# Patient Record
Sex: MALE | Race: WHITE | NOT HISPANIC OR LATINO | URBAN - METROPOLITAN AREA
[De-identification: names, ages, dates, MRNs, and addresses within clinical notes are randomized per-mention and may not be internally consistent; named-entity substitution may affect disease eponyms.]

---

## 2025-04-08 ENCOUNTER — HOSPITAL ENCOUNTER (INPATIENT)
Facility: HOSPITAL | Age: 45
LOS: 2 days | Discharge: HOME | End: 2025-04-10
Attending: STUDENT IN AN ORGANIZED HEALTH CARE EDUCATION/TRAINING PROGRAM | Admitting: INTERNAL MEDICINE
Payer: COMMERCIAL

## 2025-04-08 ENCOUNTER — APPOINTMENT (OUTPATIENT)
Dept: CARDIOLOGY | Facility: HOSPITAL | Age: 45
End: 2025-04-08
Payer: COMMERCIAL

## 2025-04-08 ENCOUNTER — APPOINTMENT (OUTPATIENT)
Dept: RADIOLOGY | Facility: HOSPITAL | Age: 45
End: 2025-04-08
Payer: COMMERCIAL

## 2025-04-08 DIAGNOSIS — R93.89 ABNORMAL CT SCAN: ICD-10-CM

## 2025-04-08 DIAGNOSIS — I48.91 ATRIAL FIBRILLATION WITH RVR (MULTI): Primary | ICD-10-CM

## 2025-04-08 DIAGNOSIS — R91.8 PULMONARY NODULES: ICD-10-CM

## 2025-04-08 DIAGNOSIS — I48.91 ATRIAL FIBRILLATION WITH RAPID VENTRICULAR RESPONSE (MULTI): ICD-10-CM

## 2025-04-08 PROBLEM — B83.9 WORMS IN STOOL: Status: ACTIVE | Noted: 2025-04-08

## 2025-04-08 PROBLEM — R59.0 MEDIASTINAL ADENOPATHY: Status: ACTIVE | Noted: 2025-04-08

## 2025-04-08 LAB
ANION GAP SERPL CALC-SCNC: 11 MMOL/L (ref 10–20)
ANION GAP SERPL CALC-SCNC: 14 MMOL/L (ref 10–20)
APTT PPP: 29 SECONDS (ref 26–36)
BASOPHILS # BLD AUTO: 0.04 X10*3/UL (ref 0–0.1)
BASOPHILS NFR BLD AUTO: 0.7 %
BNP SERPL-MCNC: 346 PG/ML (ref 0–99)
BUN SERPL-MCNC: 13 MG/DL (ref 6–23)
BUN SERPL-MCNC: 13 MG/DL (ref 6–23)
CALCIUM SERPL-MCNC: 8.7 MG/DL (ref 8.6–10.3)
CALCIUM SERPL-MCNC: 8.7 MG/DL (ref 8.6–10.3)
CARDIAC TROPONIN I PNL SERPL HS: 4 NG/L (ref 0–20)
CARDIAC TROPONIN I PNL SERPL HS: 5 NG/L (ref 0–20)
CARDIAC TROPONIN I PNL SERPL HS: 5 NG/L (ref 0–20)
CHLORIDE SERPL-SCNC: 106 MMOL/L (ref 98–107)
CHLORIDE SERPL-SCNC: 107 MMOL/L (ref 98–107)
CO2 SERPL-SCNC: 23 MMOL/L (ref 21–32)
CO2 SERPL-SCNC: 24 MMOL/L (ref 21–32)
CREAT SERPL-MCNC: 0.74 MG/DL (ref 0.5–1.3)
CREAT SERPL-MCNC: 0.77 MG/DL (ref 0.5–1.3)
D DIMER PPP FEU-MCNC: <215 NG/ML FEU
EGFRCR SERPLBLD CKD-EPI 2021: >90 ML/MIN/1.73M*2
EGFRCR SERPLBLD CKD-EPI 2021: >90 ML/MIN/1.73M*2
EOSINOPHIL # BLD AUTO: 0.16 X10*3/UL (ref 0–0.7)
EOSINOPHIL NFR BLD AUTO: 2.6 %
ERYTHROCYTE [DISTWIDTH] IN BLOOD BY AUTOMATED COUNT: 12.5 % (ref 11.5–14.5)
ERYTHROCYTE [DISTWIDTH] IN BLOOD BY AUTOMATED COUNT: 12.5 % (ref 11.5–14.5)
FLUAV RNA RESP QL NAA+PROBE: NOT DETECTED
FLUBV RNA RESP QL NAA+PROBE: NOT DETECTED
GLUCOSE SERPL-MCNC: 121 MG/DL (ref 74–99)
GLUCOSE SERPL-MCNC: 94 MG/DL (ref 74–99)
HCT VFR BLD AUTO: 46.8 % (ref 41–52)
HCT VFR BLD AUTO: 46.9 % (ref 41–52)
HGB BLD-MCNC: 16.1 G/DL (ref 13.5–17.5)
HGB BLD-MCNC: 16.1 G/DL (ref 13.5–17.5)
IMM GRANULOCYTES # BLD AUTO: 0.01 X10*3/UL (ref 0–0.7)
IMM GRANULOCYTES NFR BLD AUTO: 0.2 % (ref 0–0.9)
LYMPHOCYTES # BLD AUTO: 2.02 X10*3/UL (ref 1.2–4.8)
LYMPHOCYTES NFR BLD AUTO: 33.3 %
MAGNESIUM SERPL-MCNC: 1.96 MG/DL (ref 1.6–2.4)
MAGNESIUM SERPL-MCNC: 2.06 MG/DL (ref 1.6–2.4)
MCH RBC QN AUTO: 28.8 PG (ref 26–34)
MCH RBC QN AUTO: 29.3 PG (ref 26–34)
MCHC RBC AUTO-ENTMCNC: 34.3 G/DL (ref 32–36)
MCHC RBC AUTO-ENTMCNC: 34.4 G/DL (ref 32–36)
MCV RBC AUTO: 84 FL (ref 80–100)
MCV RBC AUTO: 85 FL (ref 80–100)
MONOCYTES # BLD AUTO: 0.65 X10*3/UL (ref 0.1–1)
MONOCYTES NFR BLD AUTO: 10.7 %
NEUTROPHILS # BLD AUTO: 3.18 X10*3/UL (ref 1.2–7.7)
NEUTROPHILS NFR BLD AUTO: 52.5 %
NRBC BLD-RTO: 0 /100 WBCS (ref 0–0)
NRBC BLD-RTO: 0 /100 WBCS (ref 0–0)
PLATELET # BLD AUTO: 198 X10*3/UL (ref 150–450)
PLATELET # BLD AUTO: 203 X10*3/UL (ref 150–450)
POTASSIUM SERPL-SCNC: 3.7 MMOL/L (ref 3.5–5.3)
POTASSIUM SERPL-SCNC: 4 MMOL/L (ref 3.5–5.3)
PSA SERPL-MCNC: 2.47 NG/ML
Q ONSET: 220 MS
Q ONSET: 221 MS
QRS COUNT: 20 BEATS
QRS COUNT: 21 BEATS
QRS DURATION: 82 MS
QRS DURATION: 84 MS
QT INTERVAL: 304 MS
QT INTERVAL: 314 MS
QTC CALCULATION(BAZETT): 443 MS
QTC CALCULATION(BAZETT): 447 MS
QTC FREDERICIA: 391 MS
QTC FREDERICIA: 397 MS
R AXIS: 44 DEGREES
R AXIS: 45 DEGREES
RBC # BLD AUTO: 5.49 X10*6/UL (ref 4.5–5.9)
RBC # BLD AUTO: 5.59 X10*6/UL (ref 4.5–5.9)
SARS-COV-2 RNA RESP QL NAA+PROBE: NOT DETECTED
SODIUM SERPL-SCNC: 138 MMOL/L (ref 136–145)
SODIUM SERPL-SCNC: 139 MMOL/L (ref 136–145)
T AXIS: 21 DEGREES
T AXIS: 42 DEGREES
T OFFSET: 373 MS
T OFFSET: 377 MS
TSH SERPL-ACNC: 2.57 MIU/L (ref 0.44–3.98)
UFH PPP CHRO-ACNC: 0.4 IU/ML
UFH PPP CHRO-ACNC: 0.8 IU/ML
VENTRICULAR RATE: 122 BPM
VENTRICULAR RATE: 128 BPM
WBC # BLD AUTO: 6.1 X10*3/UL (ref 4.4–11.3)
WBC # BLD AUTO: 6.1 X10*3/UL (ref 4.4–11.3)

## 2025-04-08 PROCEDURE — 84484 ASSAY OF TROPONIN QUANT: CPT | Performed by: STUDENT IN AN ORGANIZED HEALTH CARE EDUCATION/TRAINING PROGRAM

## 2025-04-08 PROCEDURE — 83880 ASSAY OF NATRIURETIC PEPTIDE: CPT | Performed by: STUDENT IN AN ORGANIZED HEALTH CARE EDUCATION/TRAINING PROGRAM

## 2025-04-08 PROCEDURE — 36415 COLL VENOUS BLD VENIPUNCTURE: CPT | Performed by: INTERNAL MEDICINE

## 2025-04-08 PROCEDURE — 2500000005 HC RX 250 GENERAL PHARMACY W/O HCPCS: Performed by: STUDENT IN AN ORGANIZED HEALTH CARE EDUCATION/TRAINING PROGRAM

## 2025-04-08 PROCEDURE — 99222 1ST HOSP IP/OBS MODERATE 55: CPT | Performed by: STUDENT IN AN ORGANIZED HEALTH CARE EDUCATION/TRAINING PROGRAM

## 2025-04-08 PROCEDURE — 93005 ELECTROCARDIOGRAM TRACING: CPT

## 2025-04-08 PROCEDURE — 71045 X-RAY EXAM CHEST 1 VIEW: CPT

## 2025-04-08 PROCEDURE — 85520 HEPARIN ASSAY: CPT | Performed by: NURSE PRACTITIONER

## 2025-04-08 PROCEDURE — 71275 CT ANGIOGRAPHY CHEST: CPT | Performed by: RADIOLOGY

## 2025-04-08 PROCEDURE — 99223 1ST HOSP IP/OBS HIGH 75: CPT | Performed by: INTERNAL MEDICINE

## 2025-04-08 PROCEDURE — 84484 ASSAY OF TROPONIN QUANT: CPT | Performed by: INTERNAL MEDICINE

## 2025-04-08 PROCEDURE — 80048 BASIC METABOLIC PNL TOTAL CA: CPT | Performed by: STUDENT IN AN ORGANIZED HEALTH CARE EDUCATION/TRAINING PROGRAM

## 2025-04-08 PROCEDURE — 93306 TTE W/DOPPLER COMPLETE: CPT

## 2025-04-08 PROCEDURE — 2500000001 HC RX 250 WO HCPCS SELF ADMINISTERED DRUGS (ALT 637 FOR MEDICARE OP): Performed by: INTERNAL MEDICINE

## 2025-04-08 PROCEDURE — 84153 ASSAY OF PSA TOTAL: CPT | Mod: GEALAB | Performed by: INTERNAL MEDICINE

## 2025-04-08 PROCEDURE — 2500000005 HC RX 250 GENERAL PHARMACY W/O HCPCS: Performed by: INTERNAL MEDICINE

## 2025-04-08 PROCEDURE — 74174 CTA ABD&PLVS W/CONTRAST: CPT | Performed by: RADIOLOGY

## 2025-04-08 PROCEDURE — 83735 ASSAY OF MAGNESIUM: CPT | Performed by: STUDENT IN AN ORGANIZED HEALTH CARE EDUCATION/TRAINING PROGRAM

## 2025-04-08 PROCEDURE — 99221 1ST HOSP IP/OBS SF/LOW 40: CPT

## 2025-04-08 PROCEDURE — 2500000004 HC RX 250 GENERAL PHARMACY W/ HCPCS (ALT 636 FOR OP/ED): Performed by: STUDENT IN AN ORGANIZED HEALTH CARE EDUCATION/TRAINING PROGRAM

## 2025-04-08 PROCEDURE — 96374 THER/PROPH/DIAG INJ IV PUSH: CPT

## 2025-04-08 PROCEDURE — 96375 TX/PRO/DX INJ NEW DRUG ADDON: CPT

## 2025-04-08 PROCEDURE — 96376 TX/PRO/DX INJ SAME DRUG ADON: CPT

## 2025-04-08 PROCEDURE — 93306 TTE W/DOPPLER COMPLETE: CPT | Performed by: STUDENT IN AN ORGANIZED HEALTH CARE EDUCATION/TRAINING PROGRAM

## 2025-04-08 PROCEDURE — 2500000004 HC RX 250 GENERAL PHARMACY W/ HCPCS (ALT 636 FOR OP/ED): Performed by: NURSE PRACTITIONER

## 2025-04-08 PROCEDURE — 71275 CT ANGIOGRAPHY CHEST: CPT

## 2025-04-08 PROCEDURE — 99291 CRITICAL CARE FIRST HOUR: CPT | Performed by: STUDENT IN AN ORGANIZED HEALTH CARE EDUCATION/TRAINING PROGRAM

## 2025-04-08 PROCEDURE — 85520 HEPARIN ASSAY: CPT | Performed by: FAMILY MEDICINE

## 2025-04-08 PROCEDURE — 2060000001 HC INTERMEDIATE ICU ROOM DAILY

## 2025-04-08 PROCEDURE — 80048 BASIC METABOLIC PNL TOTAL CA: CPT | Performed by: INTERNAL MEDICINE

## 2025-04-08 PROCEDURE — 71045 X-RAY EXAM CHEST 1 VIEW: CPT | Performed by: RADIOLOGY

## 2025-04-08 PROCEDURE — 83735 ASSAY OF MAGNESIUM: CPT | Performed by: INTERNAL MEDICINE

## 2025-04-08 PROCEDURE — 36415 COLL VENOUS BLD VENIPUNCTURE: CPT | Performed by: STUDENT IN AN ORGANIZED HEALTH CARE EDUCATION/TRAINING PROGRAM

## 2025-04-08 PROCEDURE — 2500000004 HC RX 250 GENERAL PHARMACY W/ HCPCS (ALT 636 FOR OP/ED): Performed by: INTERNAL MEDICINE

## 2025-04-08 PROCEDURE — 85730 THROMBOPLASTIN TIME PARTIAL: CPT | Performed by: NURSE PRACTITIONER

## 2025-04-08 PROCEDURE — 93010 ELECTROCARDIOGRAM REPORT: CPT | Performed by: STUDENT IN AN ORGANIZED HEALTH CARE EDUCATION/TRAINING PROGRAM

## 2025-04-08 PROCEDURE — 85027 COMPLETE CBC AUTOMATED: CPT | Performed by: INTERNAL MEDICINE

## 2025-04-08 PROCEDURE — 84443 ASSAY THYROID STIM HORMONE: CPT | Performed by: INTERNAL MEDICINE

## 2025-04-08 PROCEDURE — 85379 FIBRIN DEGRADATION QUANT: CPT | Performed by: STUDENT IN AN ORGANIZED HEALTH CARE EDUCATION/TRAINING PROGRAM

## 2025-04-08 PROCEDURE — 2500000001 HC RX 250 WO HCPCS SELF ADMINISTERED DRUGS (ALT 637 FOR MEDICARE OP): Performed by: NURSE PRACTITIONER

## 2025-04-08 PROCEDURE — 2550000001 HC RX 255 CONTRASTS: Performed by: STUDENT IN AN ORGANIZED HEALTH CARE EDUCATION/TRAINING PROGRAM

## 2025-04-08 PROCEDURE — 87636 SARSCOV2 & INF A&B AMP PRB: CPT | Performed by: STUDENT IN AN ORGANIZED HEALTH CARE EDUCATION/TRAINING PROGRAM

## 2025-04-08 PROCEDURE — 85025 COMPLETE CBC W/AUTO DIFF WBC: CPT | Performed by: STUDENT IN AN ORGANIZED HEALTH CARE EDUCATION/TRAINING PROGRAM

## 2025-04-08 PROCEDURE — 94760 N-INVAS EAR/PLS OXIMETRY 1: CPT

## 2025-04-08 RX ORDER — METOPROLOL TARTRATE 1 MG/ML
5 INJECTION, SOLUTION INTRAVENOUS EVERY 6 HOURS PRN
Status: DISCONTINUED | OUTPATIENT
Start: 2025-04-08 | End: 2025-04-10 | Stop reason: HOSPADM

## 2025-04-08 RX ORDER — ASPIRIN 81 MG/1
81 TABLET ORAL DAILY
Status: DISCONTINUED | OUTPATIENT
Start: 2025-04-08 | End: 2025-04-10

## 2025-04-08 RX ORDER — METOPROLOL TARTRATE 25 MG/1
25 TABLET, FILM COATED ORAL 4 TIMES DAILY
Status: DISCONTINUED | OUTPATIENT
Start: 2025-04-08 | End: 2025-04-10

## 2025-04-08 RX ORDER — DILTIAZEM HCL/D5W 125 MG/125
5-15 PLASTIC BAG, INJECTION (ML) INTRAVENOUS CONTINUOUS
Status: DISCONTINUED | OUTPATIENT
Start: 2025-04-08 | End: 2025-04-08

## 2025-04-08 RX ORDER — DILTIAZEM HYDROCHLORIDE 5 MG/ML
10 INJECTION INTRAVENOUS ONCE
Status: COMPLETED | OUTPATIENT
Start: 2025-04-08 | End: 2025-04-08

## 2025-04-08 RX ORDER — DIGOXIN 0.25 MG/ML
500 INJECTION INTRAMUSCULAR; INTRAVENOUS ONCE
Status: COMPLETED | OUTPATIENT
Start: 2025-04-08 | End: 2025-04-08

## 2025-04-08 RX ORDER — METOPROLOL TARTRATE 1 MG/ML
5 INJECTION, SOLUTION INTRAVENOUS ONCE
Status: COMPLETED | OUTPATIENT
Start: 2025-04-08 | End: 2025-04-08

## 2025-04-08 RX ORDER — DIGOXIN 0.25 MG/ML
250 INJECTION INTRAMUSCULAR; INTRAVENOUS ONCE
Status: DISCONTINUED | OUTPATIENT
Start: 2025-04-08 | End: 2025-04-08

## 2025-04-08 RX ORDER — DIGOXIN 0.25 MG/ML
125 INJECTION INTRAMUSCULAR; INTRAVENOUS DAILY
Status: DISCONTINUED | OUTPATIENT
Start: 2025-04-08 | End: 2025-04-08

## 2025-04-08 RX ORDER — DIPHENHYDRAMINE HYDROCHLORIDE 50 MG/ML
50 INJECTION, SOLUTION INTRAMUSCULAR; INTRAVENOUS ONCE
Status: COMPLETED | OUTPATIENT
Start: 2025-04-08 | End: 2025-04-08

## 2025-04-08 RX ORDER — HEPARIN SODIUM 10000 [USP'U]/100ML
0-4500 INJECTION, SOLUTION INTRAVENOUS CONTINUOUS
Status: DISPENSED | OUTPATIENT
Start: 2025-04-08 | End: 2025-04-09

## 2025-04-08 RX ADMIN — METOPROLOL TARTRATE 5 MG: 5 INJECTION INTRAVENOUS at 14:15

## 2025-04-08 RX ADMIN — DIGOXIN 125 MCG: 0.25 INJECTION INTRAMUSCULAR; INTRAVENOUS at 08:54

## 2025-04-08 RX ADMIN — ASPIRIN 81 MG: 81 TABLET, COATED ORAL at 08:56

## 2025-04-08 RX ADMIN — HEPARIN SODIUM 1500 UNITS/HR: 10000 INJECTION, SOLUTION INTRAVENOUS at 23:08

## 2025-04-08 RX ADMIN — METOPROLOL TARTRATE 25 MG: 25 TABLET, FILM COATED ORAL at 17:02

## 2025-04-08 RX ADMIN — HEPARIN SODIUM 1700 UNITS/HR: 10000 INJECTION, SOLUTION INTRAVENOUS at 13:07

## 2025-04-08 RX ADMIN — Medication 5 MG/HR: at 03:01

## 2025-04-08 RX ADMIN — DILTIAZEM HYDROCHLORIDE 10 MG: 5 INJECTION INTRAVENOUS at 03:01

## 2025-04-08 RX ADMIN — IOHEXOL 90 ML: 350 INJECTION, SOLUTION INTRAVENOUS at 01:38

## 2025-04-08 RX ADMIN — Medication 21 PERCENT: at 09:01

## 2025-04-08 RX ADMIN — METOPROLOL TARTRATE 5 MG: 5 INJECTION INTRAVENOUS at 01:24

## 2025-04-08 RX ADMIN — METOPROLOL TARTRATE 5 MG: 5 INJECTION INTRAVENOUS at 03:25

## 2025-04-08 RX ADMIN — DIPHENHYDRAMINE HYDROCHLORIDE 50 MG: 50 INJECTION, SOLUTION INTRAMUSCULAR; INTRAVENOUS at 03:25

## 2025-04-08 RX ADMIN — METOPROLOL TARTRATE 25 MG: 25 TABLET, FILM COATED ORAL at 13:07

## 2025-04-08 RX ADMIN — METHYLPREDNISOLONE SODIUM SUCCINATE 125 MG: 125 INJECTION, POWDER, FOR SOLUTION INTRAMUSCULAR; INTRAVENOUS at 03:25

## 2025-04-08 RX ADMIN — DIGOXIN 500 MCG: 0.25 INJECTION INTRAMUSCULAR; INTRAVENOUS at 06:27

## 2025-04-08 SDOH — SOCIAL STABILITY: SOCIAL INSECURITY: ARE THERE ANY APPARENT SIGNS OF INJURIES/BEHAVIORS THAT COULD BE RELATED TO ABUSE/NEGLECT?: NO

## 2025-04-08 SDOH — SOCIAL STABILITY: SOCIAL INSECURITY
WITHIN THE LAST YEAR, HAVE YOU BEEN RAPED OR FORCED TO HAVE ANY KIND OF SEXUAL ACTIVITY BY YOUR PARTNER OR EX-PARTNER?: NO

## 2025-04-08 SDOH — SOCIAL STABILITY: SOCIAL INSECURITY: WITHIN THE LAST YEAR, HAVE YOU BEEN AFRAID OF YOUR PARTNER OR EX-PARTNER?: NO

## 2025-04-08 SDOH — ECONOMIC STABILITY: TRANSPORTATION INSECURITY: IN THE PAST 12 MONTHS, HAS LACK OF TRANSPORTATION KEPT YOU FROM MEDICAL APPOINTMENTS OR FROM GETTING MEDICATIONS?: NO

## 2025-04-08 SDOH — ECONOMIC STABILITY: HOUSING INSECURITY: AT ANY TIME IN THE PAST 12 MONTHS, WERE YOU HOMELESS OR LIVING IN A SHELTER (INCLUDING NOW)?: NO

## 2025-04-08 SDOH — SOCIAL STABILITY: SOCIAL INSECURITY: HAS ANYONE EVER THREATENED TO HURT YOUR FAMILY OR YOUR PETS?: NO

## 2025-04-08 SDOH — ECONOMIC STABILITY: HOUSING INSECURITY: IN THE LAST 12 MONTHS, WAS THERE A TIME WHEN YOU WERE NOT ABLE TO PAY THE MORTGAGE OR RENT ON TIME?: NO

## 2025-04-08 SDOH — ECONOMIC STABILITY: FOOD INSECURITY: WITHIN THE PAST 12 MONTHS, THE FOOD YOU BOUGHT JUST DIDN'T LAST AND YOU DIDN'T HAVE MONEY TO GET MORE.: NEVER TRUE

## 2025-04-08 SDOH — ECONOMIC STABILITY: FOOD INSECURITY: WITHIN THE PAST 12 MONTHS, YOU WORRIED THAT YOUR FOOD WOULD RUN OUT BEFORE YOU GOT THE MONEY TO BUY MORE.: NEVER TRUE

## 2025-04-08 SDOH — SOCIAL STABILITY: SOCIAL INSECURITY: HAVE YOU HAD ANY THOUGHTS OF HARMING ANYONE ELSE?: NO

## 2025-04-08 SDOH — ECONOMIC STABILITY: HOUSING INSECURITY: IN THE PAST 12 MONTHS, HOW MANY TIMES HAVE YOU MOVED WHERE YOU WERE LIVING?: 0

## 2025-04-08 SDOH — SOCIAL STABILITY: SOCIAL INSECURITY: DO YOU FEEL UNSAFE GOING BACK TO THE PLACE WHERE YOU ARE LIVING?: NO

## 2025-04-08 SDOH — SOCIAL STABILITY: SOCIAL INSECURITY: ARE YOU OR HAVE YOU BEEN THREATENED OR ABUSED PHYSICALLY, EMOTIONALLY, OR SEXUALLY BY ANYONE?: NO

## 2025-04-08 SDOH — SOCIAL STABILITY: SOCIAL INSECURITY
WITHIN THE LAST YEAR, HAVE YOU BEEN KICKED, HIT, SLAPPED, OR OTHERWISE PHYSICALLY HURT BY YOUR PARTNER OR EX-PARTNER?: NO

## 2025-04-08 SDOH — SOCIAL STABILITY: SOCIAL INSECURITY: ABUSE: ADULT

## 2025-04-08 SDOH — SOCIAL STABILITY: SOCIAL INSECURITY: WITHIN THE LAST YEAR, HAVE YOU BEEN HUMILIATED OR EMOTIONALLY ABUSED IN OTHER WAYS BY YOUR PARTNER OR EX-PARTNER?: NO

## 2025-04-08 SDOH — ECONOMIC STABILITY: FOOD INSECURITY: HOW HARD IS IT FOR YOU TO PAY FOR THE VERY BASICS LIKE FOOD, HOUSING, MEDICAL CARE, AND HEATING?: NOT HARD AT ALL

## 2025-04-08 SDOH — ECONOMIC STABILITY: INCOME INSECURITY: IN THE PAST 12 MONTHS HAS THE ELECTRIC, GAS, OIL, OR WATER COMPANY THREATENED TO SHUT OFF SERVICES IN YOUR HOME?: NO

## 2025-04-08 SDOH — SOCIAL STABILITY: SOCIAL INSECURITY: HAVE YOU HAD THOUGHTS OF HARMING ANYONE ELSE?: NO

## 2025-04-08 SDOH — SOCIAL STABILITY: SOCIAL INSECURITY: DOES ANYONE TRY TO KEEP YOU FROM HAVING/CONTACTING OTHER FRIENDS OR DOING THINGS OUTSIDE YOUR HOME?: NO

## 2025-04-08 SDOH — SOCIAL STABILITY: SOCIAL INSECURITY: DO YOU FEEL ANYONE HAS EXPLOITED OR TAKEN ADVANTAGE OF YOU FINANCIALLY OR OF YOUR PERSONAL PROPERTY?: NO

## 2025-04-08 SDOH — SOCIAL STABILITY: SOCIAL INSECURITY: WERE YOU ABLE TO COMPLETE ALL THE BEHAVIORAL HEALTH SCREENINGS?: YES

## 2025-04-08 ASSESSMENT — ACTIVITIES OF DAILY LIVING (ADL)
ADEQUATE_TO_COMPLETE_ADL: YES
WALKS IN HOME: INDEPENDENT
HEARING - RIGHT EAR: FUNCTIONAL
PATIENT'S MEMORY ADEQUATE TO SAFELY COMPLETE DAILY ACTIVITIES?: YES
BATHING: INDEPENDENT
LACK_OF_TRANSPORTATION: NO
JUDGMENT_ADEQUATE_SAFELY_COMPLETE_DAILY_ACTIVITIES: YES
DRESSING YOURSELF: INDEPENDENT
GROOMING: INDEPENDENT
FEEDING YOURSELF: INDEPENDENT
TOILETING: INDEPENDENT
LACK_OF_TRANSPORTATION: NO
HEARING - LEFT EAR: FUNCTIONAL

## 2025-04-08 ASSESSMENT — COGNITIVE AND FUNCTIONAL STATUS - GENERAL
MOBILITY SCORE: 24
PATIENT BASELINE BEDBOUND: NO
DAILY ACTIVITIY SCORE: 24

## 2025-04-08 ASSESSMENT — PATIENT HEALTH QUESTIONNAIRE - PHQ9
2. FEELING DOWN, DEPRESSED OR HOPELESS: NOT AT ALL
1. LITTLE INTEREST OR PLEASURE IN DOING THINGS: NOT AT ALL
SUM OF ALL RESPONSES TO PHQ9 QUESTIONS 1 & 2: 0

## 2025-04-08 ASSESSMENT — LIFESTYLE VARIABLES
SKIP TO QUESTIONS 9-10: 1
AUDIT-C TOTAL SCORE: 0
HOW OFTEN DO YOU HAVE A DRINK CONTAINING ALCOHOL: NEVER
AUDIT-C TOTAL SCORE: 0
HOW OFTEN DO YOU HAVE 6 OR MORE DRINKS ON ONE OCCASION: NEVER
HOW MANY STANDARD DRINKS CONTAINING ALCOHOL DO YOU HAVE ON A TYPICAL DAY: PATIENT DOES NOT DRINK

## 2025-04-08 ASSESSMENT — ENCOUNTER SYMPTOMS
NAUSEA: 1
FEVER: 0
PALPITATIONS: 1
FATIGUE: 1
GASTROINTESTINAL NEGATIVE: 1
EYES NEGATIVE: 1
SHORTNESS OF BREATH: 1
MUSCULOSKELETAL NEGATIVE: 1
CONSTITUTIONAL NEGATIVE: 1
PSYCHIATRIC NEGATIVE: 1
ALLERGIC/IMMUNOLOGIC NEGATIVE: 1
NEUROLOGICAL NEGATIVE: 1
CHILLS: 0
COUGH: 0
ENDOCRINE NEGATIVE: 1

## 2025-04-08 ASSESSMENT — PAIN - FUNCTIONAL ASSESSMENT
PAIN_FUNCTIONAL_ASSESSMENT: 0-10
PAIN_FUNCTIONAL_ASSESSMENT: 0-10

## 2025-04-08 ASSESSMENT — COLUMBIA-SUICIDE SEVERITY RATING SCALE - C-SSRS
2. HAVE YOU ACTUALLY HAD ANY THOUGHTS OF KILLING YOURSELF?: NO
1. IN THE PAST MONTH, HAVE YOU WISHED YOU WERE DEAD OR WISHED YOU COULD GO TO SLEEP AND NOT WAKE UP?: NO
6. HAVE YOU EVER DONE ANYTHING, STARTED TO DO ANYTHING, OR PREPARED TO DO ANYTHING TO END YOUR LIFE?: NO

## 2025-04-08 ASSESSMENT — PAIN SCALES - GENERAL
PAINLEVEL_OUTOF10: 3
PAINLEVEL_OUTOF10: 0 - NO PAIN
PAINLEVEL_OUTOF10: 2
PAINLEVEL_OUTOF10: 0 - NO PAIN

## 2025-04-08 NOTE — ED PROVIDER NOTES
History/Exam limitations: none  HPI was provided by patient    HPI:    Chief Complaint   Patient presents with    Chest Pain     Pt states for 4 days he has had chest pain, pt states it starts in the left chest and it was on and off, but it has gotten more consistent and worsens when he lifts his left arm. Pt took 4 ASA prior to arrival of EMS. Pt denies cardiac or medical history. He endorses on and off shortness of breath and as well as nausea.         Evens Shelby is a 44 y.o. male presents with chief complaint of chest pain, short of breath.  Also associated nausea.  Patient's pain is on the left side of his chest states it began earlier today while he was working and has been constant since.  States initially did feel his heart racing but not presently.  Was given full dose aspirin prior to arrival.  Has a family history of heart disease.  Denies illicit drug use or any medical history.  Pain is nonradiating. denies any recent travel recent trauma recent immobilization history of PE or DVT, unilateral leg swelling or recent surgery and not taking any hormones.  No history of connective tissue disorders or blood disorder.  Described as a pressure..  States at times he feels the pain in the chest into his stomach and abdomen.       ROS:All other review of systems are negative except as noted above and HPI or ROS. (Bolded if positive)  CONSTITUTIONAL fever, chills.  EYES:      blurry vision, change in vision  ENT sore throat, congestion, rhinorrhea.  CARDIOVASCULAR palpitations, swelling, chest pain  RESPIRATORY cough, shortness of breath, wheeze  GI abdominal pain, nausea, vomiting, diarrhea.  GENITOURINARY dysuria, hematuria, frequency.  MUSCULOSKELETAL deformity, neck pain.  SKIN rash, color change.  NEUROLOGIC  headache, numbness, focal weakness.    Physical exam  General/Constitutional: Alert, oriented , cooperative,  in no acute distress.  Head: normocephalic, atraumatic  Skin: Intact,  dry skin, no  lesions.  Eyes: Right-sided strabismus at baseline,  Conjunctiva pink with no erythema or exudates. No scleral icterus.   ENT: No external deformities. Nares patent, mucus membranes moist.  Pharynx clear, uvula midline.   Neck: Supple, without meningismus. Trachea at midline. No lymphadenopathy. No JVD  Pulmonary: Clear bilaterally. No crackles, rhonchi or wheezing.   Cardiac: Tachycardic rate irregular rhythm.  Pulses 2+ throughout upper and lower extremities.  No murmur, gallop, rub.   Abdomen: Soft, nontender, active bowel sounds.  No palpable organomegaly.  No rebound or guarding.  No CVA tenderness.  No pulsatile mass  Genitourinary: Exam deferred.  Musculoskeletal: Full range of motion, no deformity. Pulses full and equal. Non-edematous.  Neurological: Alert and oriented to person place and time.  no focal neuro deficits.  Sensation intact throughout.  Neurovascular intact in bilateral upper and lower extremities  Psychiatric: Appropriate mood and affect. Calm.       History reviewed. No pertinent past medical history.   Social History     Socioeconomic History    Marital status: Single   Tobacco Use    Smoking status: Never    Smokeless tobacco: Never     No current outpatient medications  Allergies   Allergen Reactions    Cardizem [Diltiazem Hcl] Hives           ED Triage Vitals   Temp Pulse Resp BP   -- -- -- --      SpO2 Temp src Heart Rate Source Patient Position   -- -- -- --      BP Location FiO2 (%)     -- --                 Labs and Imaging  CT angio chest abdomen pelvis   Final Result   No evidence of aortic aneurysm or dissection.        Multiple enlarged right axillary lymph nodes which measure 2.3 cm x   1.6 cm and multiple enlarged mediastinal and hilar lymph nodes and   numerous bilateral pulmonary nodules measure up to 8 mm, findings   highly concerning for malignancy/metastatic disease and further   evaluation and workup is recommended.        Prostatomegaly; please correlate with PSA.         MACRO:   Brennan Millard discussed the significance and urgency of this critical   finding by telephone with  SHIRA SERNA on 4/8/2025 at 2:32 am.   (**-RCF-**) Findings:  See findings.        Signed by: Brennan Millard 4/8/2025 2:39 AM   Dictation workstation:   [id number]      XR chest 1 view   Final Result   1.  No evidence of acute cardiopulmonary process.                  MACRO:   None        Signed by: Washington Agosto 4/8/2025 1:35 AM   Dictation workstation:   [id number]        Labs Reviewed   B-TYPE NATRIURETIC PEPTIDE - Abnormal       Result Value     (*)     Narrative:        <100 pg/mL - Heart failure unlikely  100-299 pg/mL - Intermediate probability of acute heart                  failure exacerbation. Correlate with clinical                  context and patient history.    >=300 pg/mL - Heart Failure likely. Correlate with clinical                  context and patient history.    BNP testing is performed using different testing methodology at [facility] than at [facility]. Direct result comparisons should only be made within the same method.      BASIC METABOLIC PANEL - Normal    Glucose 94      Sodium 139      Potassium 3.7      Chloride 106      Bicarbonate 23      Anion Gap 14      Urea Nitrogen 13      Creatinine 0.77      eGFR >90      Calcium 8.7     INFLUENZA A AND B PCR - Normal    Flu A Result Not Detected      Flu B Result Not Detected      Narrative:     This assay is an in vitro diagnostic multiplex nucleic acid amplification test for the detection and discrimination of Influenza A & B from nasopharyngeal specimens, and has been validated for use at [facility]. Negative results do not preclude Influenza A/B infections, and should not be used as the sole basis for diagnosis, treatment, or other management decisions. If Influenza A/B and RSV PCR results are negative, testing for Parainfluenza virus, Adenovirus and Metapneumovirus is  routinely performed for [facility] [place] and [place] care inpatients, and is available on other patients by placing an add-on request.   SARS-COV-2 PCR - Normal    Coronavirus 2019, PCR Not Detected      Narrative:     This assay is an FDA-cleared, in vitro diagnostic nucleic acid amplification test for the qualitative detection and differentiation of SARS CoV-2 from nasopharyngeal specimens collected from individuals with signs and symptoms of respiratory tract infections, and has been validated for use at [facility]. Negative results do not preclude COVID-19 infections and should not be used as the sole basis for diagnosis, treatment, or other management decisions. Testing for SARS CoV-2 is recommended only for patients who meet current clinical and/or epidemiological criteria defined by federal, state, or local public health directives.   MAGNESIUM - Normal    Magnesium 1.96     SERIAL TROPONIN-INITIAL - Normal    Troponin I, High Sensitivity 5      Narrative:     Less than 99th percentile of normal range cutoff-  Female and children under 18 years old <14 ng/L; Male <21 ng/L: Negative  Repeat testing should be performed if clinically indicated.     Female and children under 18 years old 14-50 ng/L; Male 21-50 ng/L:  Consistent with possible cardiac damage and possible increased clinical   risk. Serial measurements may help to assess extent of myocardial damage.     >50 ng/L: Consistent with cardiac damage, increased clinical risk and  myocardial infarction. Serial measurements may help assess extent of   myocardial damage.      NOTE: Children less than 1 year old may have higher baseline troponin   levels and results should be interpreted in conjunction with the overall   clinical context.     NOTE: Troponin I testing is performed using a different   testing methodology at [facility] than at other   [facility]. Direct result comparisons should only   be made  within the same method.   D-DIMER, VTE EXCLUSION - Normal    D-Dimer, Quantitative VTE Exclusion <215      Narrative:     The VTE Exclusion D-Dimer assay is reported in ng/mL Fibrinogen Equivalent Units (FEU).    Per 's instructions for use, a value of less than 500 ng/mL (FEU) may help to exclude DVT or PE in outpatients when the assay is used with a clinical pretest probability assessment.(AEMR must utilize and document eCalc 'Wells Score Deep Vein Thrombosis Risk' for DVT exclusion only. Emergency Department should utilize  Guidelines for Emergency Department Use of the VTE Exclusion D-Dimer and Clinical Pretest probability assessment model for DVT or PE exclusion.)   SERIAL TROPONIN, 1 HOUR - Normal    Troponin I, High Sensitivity 5      Narrative:     Less than 99th percentile of normal range cutoff-  Female and children under 18 years old <14 ng/L; Male <21 ng/L: Negative  Repeat testing should be performed if clinically indicated.     Female and children under 18 years old 14-50 ng/L; Male 21-50 ng/L:  Consistent with possible cardiac damage and possible increased clinical   risk. Serial measurements may help to assess extent of myocardial damage.     >50 ng/L: Consistent with cardiac damage, increased clinical risk and  myocardial infarction. Serial measurements may help assess extent of   myocardial damage.      NOTE: Children less than 1 year old may have higher baseline troponin   levels and results should be interpreted in conjunction with the overall   clinical context.     NOTE: Troponin I testing is performed using a different   testing methodology at [facility] than at other   [facility] hospitals. Direct result comparisons should only   be made within the same method.   CBC WITH AUTO DIFFERENTIAL    WBC 6.1      nRBC 0.0      RBC 5.59      Hemoglobin 16.1      Hematocrit 46.9      MCV 84      MCH 28.8      MCHC 34.3      RDW 12.5      Platelets 198      Neutrophils % 52.5       Immature Granulocytes %, Automated 0.2      Lymphocytes % 33.3      Monocytes % 10.7      Eosinophils % 2.6      Basophils % 0.7      Neutrophils Absolute 3.18      Immature Granulocytes Absolute, Automated 0.01      Lymphocytes Absolute 2.02      Monocytes Absolute 0.65      Eosinophils Absolute 0.16      Basophils Absolute 0.04     TROPONIN SERIES- (INITIAL, 1 HR)    Narrative:     The following orders were created for panel order Troponin I Series, High Sensitivity (0, 1 HR).  Procedure                               Abnormality         Status                     ---------                               -----------         ------                     Troponin I, High Sensiti...[[id number]]  Normal              Final result               Troponin, High Sensitivi...[[id number]]  Normal              Final result                 Please view results for these tests on the individual orders.               Procedure  Critical Care    Performed by: Torsten Mcdaniels DO  Authorized by: Tortsen Mcdaniels DO    Critical care provider statement:     Critical care time (minutes):  35    Critical care time was exclusive of:  Separately billable procedures and treating other patients (Atrial fibrillation with rapid ventricular response)    Critical care was time spent personally by me on the following activities:  Ordering and performing treatments and interventions, ordering and review of laboratory studies, ordering and review of radiographic studies, pulse oximetry, re-evaluation of patient's condition, review of old charts, examination of patient, evaluation of patient's response to treatment, development of treatment plan with patient or surrogate and obtaining history from patient or surrogate    Care discussed with: admitting provider           Medical Decision Making:     External Records Reviewed: I reviewed recent and relevant outside records    ED Course as of 04/08/25 0400   Tue Apr 08, 2025   0030 EKG interpreted by me  shows atrial fibrillation with RVR.  No STEMI.  Rate 122.  Compared to prior EKG A-fib is a new finding. [WL]   0136 BNP(!): 346 [WL]   0254 Despite metoprolol still remains in A-fib RVR..  Will start on Cardizem drip. [WL]   0254 CT angio chest abdomen pelvis  No evidence of aortic aneurysm or dissection.      Multiple enlarged right axillary lymph nodes which measure 2.3 cm x  1.6 cm and multiple enlarged mediastinal and hilar lymph nodes and  numerous bilateral pulmonary nodules measure up to 8 mm, findings  highly concerning for malignancy/metastatic disease and further  evaluation and workup is recommended.      Prostatomegaly; please correlate with PSA.   [WL]   0304 After speaking with him about his results did state he has had pneumothorax in the past from a trauma. [WL]   0318 After Cardizem given developed a large welt/hive on the arm.  Will stop the Cardizem and give him metoprolol again.  Hospitalist service updated. [WL]   0337 Having no increase short of breath chest discomfort now airway intact airway patent.  Likely allergic to Cardizem.  Was then given 5 metoprolol and now heart rate down below 100 but remains A-fib. [WL]      ED Course User Index  [WL] Torsten Mcdaniels, DO         Diagnoses as of 04/08/25 0400   Atrial fibrillation with rapid ventricular response (Multi)   Abnormal CT scan   Atrial fibrillation with RVR (Multi)   Pulmonary nodules         CT angio chest abdomen pelvis   Final Result   No evidence of aortic aneurysm or dissection.        Multiple enlarged right axillary lymph nodes which measure 2.3 cm x   1.6 cm and multiple enlarged mediastinal and hilar lymph nodes and   numerous bilateral pulmonary nodules measure up to 8 mm, findings   highly concerning for malignancy/metastatic disease and further   evaluation and workup is recommended.        Prostatomegaly; please correlate with PSA.        MACRO:   Brennan Millard discussed the significance and urgency of this critical   finding  by telephone with  SHIRA SERNA on 4/8/2025 at 2:32 am.   (**-RCF-**) Findings:  See findings.        Signed by: Brennan Millard 4/8/2025 2:39 AM   Dictation workstation:   [id number]      XR chest 1 view   Final Result   1.  No evidence of acute cardiopulmonary process.                  MACRO:   None        Signed by: Washington Agosto 4/8/2025 1:35 AM   Dictation workstation:   [id number]        Labs Reviewed   B-TYPE NATRIURETIC PEPTIDE - Abnormal       Result Value     (*)     Narrative:        <100 pg/mL - Heart failure unlikely  100-299 pg/mL - Intermediate probability of acute heart                  failure exacerbation. Correlate with clinical                  context and patient history.    >=300 pg/mL - Heart Failure likely. Correlate with clinical                  context and patient history.    BNP testing is performed using different testing methodology at [facility] than at other [facility]. Direct result comparisons should only be made within the same method.      BASIC METABOLIC PANEL - Normal    Glucose 94      Sodium 139      Potassium 3.7      Chloride 106      Bicarbonate 23      Anion Gap 14      Urea Nitrogen 13      Creatinine 0.77      eGFR >90      Calcium 8.7     INFLUENZA A AND B PCR - Normal    Flu A Result Not Detected      Flu B Result Not Detected      Narrative:     This assay is an in vitro diagnostic multiplex nucleic acid amplification test for the detection and discrimination of Influenza A & B from nasopharyngeal specimens, and has been validated for use at [facility]. Negative results do not preclude Influenza A/B infections, and should not be used as the sole basis for diagnosis, treatment, or other management decisions. If Influenza A/B and RSV PCR results are negative, testing for Parainfluenza virus, Adenovirus and Metapneumovirus is routinely performed for [facility] [place]ology and intensive care inpatients, and is available  on other patients by placing an add-on request.   SARS-COV-2 PCR - Normal    Coronavirus 2019, PCR Not Detected      Narrative:     This assay is an FDA-cleared, in vitro diagnostic nucleic acid amplification test for the qualitative detection and differentiation of SARS CoV-2 from nasopharyngeal specimens collected from individuals with signs and symptoms of respiratory tract infections, and has been validated for use at [facility]. Negative results do not preclude COVID-19 infections and should not be used as the sole basis for diagnosis, treatment, or other management decisions. Testing for SARS CoV-2 is recommended only for patients who meet current clinical and/or epidemiological criteria defined by federal, state, or local public health directives.   MAGNESIUM - Normal    Magnesium 1.96     SERIAL TROPONIN-INITIAL - Normal    Troponin I, High Sensitivity 5      Narrative:     Less than 99th percentile of normal range cutoff-  Female and children under 18 years old <14 ng/L; Male <21 ng/L: Negative  Repeat testing should be performed if clinically indicated.     Female and children under 18 years old 14-50 ng/L; Male 21-50 ng/L:  Consistent with possible cardiac damage and possible increased clinical   risk. Serial measurements may help to assess extent of myocardial damage.     >50 ng/L: Consistent with cardiac damage, increased clinical risk and  myocardial infarction. Serial measurements may help assess extent of   myocardial damage.      NOTE: Children less than 1 year old may have higher baseline troponin   levels and results should be interpreted in conjunction with the overall   clinical context.     NOTE: Troponin I testing is performed using a different   testing methodology at [facility] than at [facility]. Direct result comparisons should only   be made within the same method.   D-DIMER, VTE EXCLUSION - Normal    D-Dimer, Quantitative VTE Exclusion  <215      Narrative:     The VTE Exclusion D-Dimer assay is reported in ng/mL Fibrinogen Equivalent Units (FEU).    Per 's instructions for use, a value of less than 500 ng/mL (FEU) may help to exclude DVT or PE in outpatients when the assay is used with a clinical pretest probability assessment.(AEMR must utilize and document eCalc 'Wells Score Deep Vein Thrombosis Risk' for DVT exclusion only. Emergency Department should utilize  Guidelines for Emergency Department Use of the VTE Exclusion D-Dimer and Clinical Pretest probability assessment model for DVT or PE exclusion.)   SERIAL TROPONIN, 1 HOUR - Normal    Troponin I, High Sensitivity 5      Narrative:     Less than 99th percentile of normal range cutoff-  Female and children under 18 years old <14 ng/L; Male <21 ng/L: Negative  Repeat testing should be performed if clinically indicated.     Female and children under 18 years old 14-50 ng/L; Male 21-50 ng/L:  Consistent with possible cardiac damage and possible increased clinical   risk. Serial measurements may help to assess extent of myocardial damage.     >50 ng/L: Consistent with cardiac damage, increased clinical risk and  myocardial infarction. Serial measurements may help assess extent of   myocardial damage.      NOTE: Children less than 1 year old may have higher baseline troponin   levels and results should be interpreted in conjunction with the overall   clinical context.     NOTE: Troponin I testing is performed using a different   testing methodology at [facility] than at other   [facility] hospitals. Direct result comparisons should only   be made within the same method.   CBC WITH AUTO DIFFERENTIAL    WBC 6.1      nRBC 0.0      RBC 5.59      Hemoglobin 16.1      Hematocrit 46.9      MCV 84      MCH 28.8      MCHC 34.3      RDW 12.5      Platelets 198      Neutrophils % 52.5      Immature Granulocytes %, Automated 0.2      Lymphocytes % 33.3      Monocytes % 10.7       Eosinophils % 2.6      Basophils % 0.7      Neutrophils Absolute 3.18      Immature Granulocytes Absolute, Automated 0.01      Lymphocytes Absolute 2.02      Monocytes Absolute 0.65      Eosinophils Absolute 0.16      Basophils Absolute 0.04     TROPONIN SERIES- (INITIAL, 1 HR)    Narrative:     The following orders were created for panel order Troponin I Series, High Sensitivity (0, 1 HR).  Procedure                               Abnormality         Status                     ---------                               -----------         ------                     Troponin I, High Sensiti...[[id number]]  Normal              Final result               Troponin, High Sensitivi...[[id number]]  Normal              Final result                 Please view results for these tests on the individual orders.       The patient presented for evaluation of chest pain.    Differential included but not limited to ACS, arrhythmia, pneumothorax, dissection, aneurysm, electrolyte abnormality, musculoskeletal pain, PE.  Imaging studies if performed were independently reviewed and interpreted by myself and confirmed by radiologist.  Found here to be in A-fib RVR.  Initially given metoprolol.        Patient is a medium to high risk heart score requires continued workup and management of their symptoms and will be admitted to the hospital for further evaluation and treatment.  Plan be to admit to medicine.     I discussed the differential, results and plan with the patient and/or family/friend/caregiver if present.    Note: This note was dictated by speech recognition. Minor errors in transcription may be present.           Torsten Mcdaniels DO  04/08/25 5001

## 2025-04-08 NOTE — PROGRESS NOTES
04/08/25 1105   Discharge Planning   Living Arrangements Spouse/significant other   Support Systems Spouse/significant other;Children;Friends/neighbors   Assistance Needed Patient is A&OX3. Patient is independent in ADLS, ambulates without assistive devices, and wears no O2, CPAP or Bipap at baseline. No active HHC agency. PCP was Dr. Durand who recently retired, patient will stay with same office. No anticoagulation medication prior to admit.   Type of Residence Private residence   Number of Stairs to Enter Residence 6   Number of Stairs Within Residence 26  (both upstairs and basement steps)   Do you have animals or pets at home? No  (no pets inside the home)   Who is requesting discharge planning? Provider   Home or Post Acute Services None   Expected Discharge Disposition Home  (Home, no needs-denied need for HHC, may need new ATC med at discharge and will need a price check.)   Does the patient need discharge transport arranged? No   Financial Resource Strain   How hard is it for you to pay for the very basics like food, housing, medical care, and heating? Not hard   Housing Stability   In the last 12 months, was there a time when you were not able to pay the mortgage or rent on time? N   In the past 12 months, how many times have you moved where you were living? 0   At any time in the past 12 months, were you homeless or living in a shelter (including now)? N   Transportation Needs   In the past 12 months, has lack of transportation kept you from medical appointments or from getting medications? no   Stroke Family Assessment   Stroke Family Assessment Needed No   Intensity of Service   Intensity of Service 0-30 min

## 2025-04-08 NOTE — ASSESSMENT & PLAN NOTE
Up to 8mm.  Inflammatory versus malignancy.  Has more of inflammatory appearance.  Plan to repeat CT-chest in 3 months as outpatient.

## 2025-04-08 NOTE — CONSULTS
Reason For Consult  Multiple lung nodule and axillary LAD cf for malignancy.     History Of Present Illness  Evens Shelby is a 44 y.o. male presenting with no significant who presented for palpitations and left sided chest pain radiating to left shoulder. Pain Is exertional with associated dyspnea. There is associated nausea, diaphoresis and lightheadedness prior to coming to the ED. Upon arrival to the hospital, he was noted to be tachycardic, 12 lead EKG done showed new onset afib with RVR. CT chest showed multiple lung nodes with axillary involvement. Multiple enlarged right axillary lymph nodes which measure 2.3 cm x 1.6 cm and multiple enlarged mediastinal and hilar lymph nodes and numerous bilateral pulmonary nodules measure up to 8 mm, findings highly concerning for malignancy/metastatic disease and further evaluation and workup is recommended.Labs largely unremarkable. Of note patient reports that he has not since a physician for sometime since his pcp retired. He reports that except for remote history of falls which he was hospitalized for at [facility]. He denies any smoking hx, or use of tobacco products. He denies use of illegal substance. He does not drink Etoh, or use any herbal preparations. The patient denies any unintentional weight loss. No night sweats, or changes in speech, vision. No FND.  No recent insect bites. He says that he has several horses and a donkey and lives a non sedentary life. Review of charts didn't show any available image for comparison of new lung nodules.      Past Medical History  Trauma (pt fell off a roof), Pneumothorax Splenic injury Hx of open globe injury, s/p SB/PPV/PPL Right eye Ocular HTN Right eye  Aphakia Right eye     Surgical History  REPAIR RUPTURED GLOBE (Right)  Scleral buckle, Pars Plana vitrectomy, pars plana lensectomy, right eye  Left foot surgery Right foot surgery     Social History  He reports that he has never smoked. He has never used smokeless tobacco. No  "history on file for alcohol use and drug use.    Family History  No family history on file.     Allergies  Cardizem [diltiazem hcl]    Review of Systems  12 points ROS negative except as stated in the HPI above.      Physical Exam  GE: lying comfortable in bed, in NAD  HEENT; AT/NC, EOMI, no conjunctival pallor, anicteric sclera, dry mucous membrane  Neck; supple neck, no LAD/JVD.   Chest; Good air entry b/l, no adventitious sounds heard, normal rise and fall of thoracis cavity during each respiratory cycle.  CVS; s1 and s2, tachycardic, no murmurs  Abd; soft, NT/ND, +BS, no organomegaly, No guarding/rebound tenderness  Ext; no pedal edema/cyanosis/clubbing, pulses intact  Neuro; Aox3, Cn 2-12 intact, sensation intact, Motor 5/5 globally  Psych; normal mood/affect.      Last Recorded Vitals  Blood pressure 115/79, pulse (!) 140, temperature 36.7 °C (98.1 °F), temperature source Temporal, resp. rate 12, height 1.753 m (5' 9\"), weight 93.5 kg (206 lb 3.2 oz), SpO2 94%.    Relevant Results  Results for orders placed or performed during the hospital encounter of 04/08/25 (from the past 24 hours)   CBC and Auto Differential   Result Value Ref Range    WBC 6.1 4.4 - 11.3 x10*3/uL    nRBC 0.0 0.0 - 0.0 /100 WBCs    RBC 5.59 4.50 - 5.90 x10*6/uL    Hemoglobin 16.1 13.5 - 17.5 g/dL    Hematocrit 46.9 41.0 - 52.0 %    MCV 84 80 - 100 fL    MCH 28.8 26.0 - 34.0 pg    MCHC 34.3 32.0 - 36.0 g/dL    RDW 12.5 11.5 - 14.5 %    Platelets 198 150 - 450 x10*3/uL    Neutrophils % 52.5 40.0 - 80.0 %    Immature Granulocytes %, Automated 0.2 0.0 - 0.9 %    Lymphocytes % 33.3 13.0 - 44.0 %    Monocytes % 10.7 2.0 - 10.0 %    Eosinophils % 2.6 0.0 - 6.0 %    Basophils % 0.7 0.0 - 2.0 %    Neutrophils Absolute 3.18 1.20 - 7.70 x10*3/uL    Immature Granulocytes Absolute, Automated 0.01 0.00 - 0.70 x10*3/uL    Lymphocytes Absolute 2.02 1.20 - 4.80 x10*3/uL    Monocytes Absolute 0.65 0.10 - 1.00 x10*3/uL    Eosinophils Absolute 0.16 0.00 - " 0.70 x10*3/uL    Basophils Absolute 0.04 0.00 - 0.10 x10*3/uL   Basic Metabolic Panel   Result Value Ref Range    Glucose 94 74 - 99 mg/dL    Sodium 139 136 - 145 mmol/L    Potassium 3.7 3.5 - 5.3 mmol/L    Chloride 106 98 - 107 mmol/L    Bicarbonate 23 21 - 32 mmol/L    Anion Gap 14 10 - 20 mmol/L    Urea Nitrogen 13 6 - 23 mg/dL    Creatinine 0.77 0.50 - 1.30 mg/dL    eGFR >90 >60 mL/min/1.73m*2    Calcium 8.7 8.6 - 10.3 mg/dL   B-Type Natriuretic Peptide   Result Value Ref Range     (H) 0 - 99 pg/mL   Influenza A, and B PCR   Result Value Ref Range    Flu A Result Not Detected Not Detected    Flu B Result Not Detected Not Detected   Sars-CoV-2 PCR   Result Value Ref Range    Coronavirus 2019, PCR Not Detected Not Detected   Magnesium   Result Value Ref Range    Magnesium 1.96 1.60 - 2.40 mg/dL   Troponin I, High Sensitivity, Initial   Result Value Ref Range    Troponin I, High Sensitivity 5 0 - 20 ng/L   D-Dimer, VTE Exclusion   Result Value Ref Range    D-Dimer, Quantitative VTE Exclusion <215 <=500 ng/mL FEU   ECG 12 Lead   Result Value Ref Range    Ventricular Rate 122 BPM    QRS Duration 82 ms    QT Interval 314 ms    QTC Calculation(Bazett) 447 ms    R Axis 45 degrees    T Axis 42 degrees    QRS Count 20 beats    Q Onset 220 ms    T Offset 377 ms    QTC Fredericia 397 ms   Troponin, High Sensitivity, 1 Hour   Result Value Ref Range    Troponin I, High Sensitivity 5 0 - 20 ng/L   Thyroid Stimulating Hormone   Result Value Ref Range    Thyroid Stimulating Hormone 2.57 0.44 - 3.98 mIU/L   CBC   Result Value Ref Range    WBC 6.1 4.4 - 11.3 x10*3/uL    nRBC 0.0 0.0 - 0.0 /100 WBCs    RBC 5.49 4.50 - 5.90 x10*6/uL    Hemoglobin 16.1 13.5 - 17.5 g/dL    Hematocrit 46.8 41.0 - 52.0 %    MCV 85 80 - 100 fL    MCH 29.3 26.0 - 34.0 pg    MCHC 34.4 32.0 - 36.0 g/dL    RDW 12.5 11.5 - 14.5 %    Platelets 203 150 - 450 x10*3/uL   Basic Metabolic Panel   Result Value Ref Range    Glucose 121 (H) 74 - 99 mg/dL     Sodium 138 136 - 145 mmol/L    Potassium 4.0 3.5 - 5.3 mmol/L    Chloride 107 98 - 107 mmol/L    Bicarbonate 24 21 - 32 mmol/L    Anion Gap 11 10 - 20 mmol/L    Urea Nitrogen 13 6 - 23 mg/dL    Creatinine 0.74 0.50 - 1.30 mg/dL    eGFR >90 >60 mL/min/1.73m*2    Calcium 8.7 8.6 - 10.3 mg/dL   Magnesium   Result Value Ref Range    Magnesium 2.06 1.60 - 2.40 mg/dL   Troponin I, High Sensitivity   Result Value Ref Range    Troponin I, High Sensitivity 4 0 - 20 ng/L         Assessment/Plan   Evens Shelby is a 44 y.o. male presenting with no significant who presented for palpitations and left sided chest pain radiating to left shoulder. Pain Is exertional with associated dyspnea. There is associated nausea, diaphoresis and lightheadedness prior to coming to the ED. Multiple enlarged right axillary lymph nodes which measure 2.3 cm x1.6 cm and multiple enlarged mediastinal and hilar lymph nodes and numerous bilateral pulmonary nodules measure up to 8 mm, and axillary LAD findings highly concerning for malignancy/metastatic disease and further evaluation and workup is recommended. No prior image available for comparison.    New, Sub centimeter lung nodules, with LAD largely bellow 2cm, cf for malignancy.   New onset afib RVR    Recommendations;   Repeat imaging in 6 months.   Patient will benefit form outpatient follow up and possible biopsy if  incr size of lesions    Thank you for consulting Heme onc. Above plan was discussed with Dr Attending Heme Onc physician who is in agreement.     Momo Alvarado MD

## 2025-04-08 NOTE — H&P
History Of Present Illness  Evens hSelby is a 44 y.o. male presenting with palpitations.  He states he developed palpitations 4 days ago.  He also mentions having left-sided chest pain radiating to his left shoulder that was initially brought on by exertion but occurred at rest last night.  He started to get dyspneic on exertion 2 days ago.  He mentions having nausea without vomiting, lightheadedness and diaphoresis as well. He felt much worse overnight so he called EMS and was brought to the ED. He was noted to be tachycardic on arrival and his EKG showed new onset atrial fibrillation with ventricular rate of around 120.  CT of his chest revealed multiple enlarged right axillary lymph nodes, multiple enlarged mediastinal and hilar lymph nodes and numerous bilateral pulmonary nodules measuring up to 8 mm -- findings highly concerning for malignancy/metastatic disease. He denies cough. No weight loss. Pt denies history of tobacco use.     Past Medical History  Trauma (pt fell off a roof)  Pneumothorax  Splenic injury  Hx of open globe injury, s/p SB/PPV/PPL Right eye  Ocular HTN Right eye   Aphakia Right eye     Past Surgical History  REPAIR RUPTURED GLOBE (Right)   Scleral buckle, Pars Plana vitrectomy, pars plana lensectomy, right eye   Left foot surgery  Right foot surgery     Social History  He reports that he has never smoked. He has never used smokeless tobacco. No history on file for alcohol use and drug use.  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  No family history of diabetes.  Mother has history of heart disease     Allergies  Cardizem [diltiazem hcl]    Medications  No current facility-administered medications on file prior to encounter.     No current outpatient medications on file prior to encounter.       Review of Systems   Constitutional: Negative.    HENT: Negative.     Eyes: Negative.    Respiratory:  Positive for shortness of breath.     Cardiovascular:  Positive for chest pain and palpitations.   Gastrointestinal:  Positive for nausea.   Endocrine: Negative.    Genitourinary: Negative.    Musculoskeletal: Negative.    Skin: Negative.    Allergic/Immunologic: Negative.    Neurological: Negative.    Hematological:         See HPI   Psychiatric/Behavioral: Negative.          Physical Exam  Constitutional:       General: He is not in acute distress.     Appearance: Normal appearance. He is not ill-appearing, toxic-appearing or diaphoretic.   HENT:      Head: Normocephalic and atraumatic.      Right Ear: External ear normal.      Left Ear: External ear normal.      Nose: Nose normal.      Mouth/Throat:      Mouth: Mucous membranes are moist.      Pharynx: Oropharynx is clear. No oropharyngeal exudate or posterior oropharyngeal erythema.   Eyes:      General: No scleral icterus.        Right eye: No discharge.         Left eye: No discharge.      Conjunctiva/sclera: Conjunctivae normal.   Cardiovascular:      Rate and Rhythm: Tachycardia present. Rhythm irregular.      Comments: S1 and S2 irregularly irregular and tachycardic  Pulmonary:      Breath sounds: No wheezing, rhonchi or rales.   Abdominal:      General: There is no distension.      Palpations: Abdomen is soft. There is no mass.      Tenderness: There is no abdominal tenderness. There is no right CVA tenderness, left CVA tenderness, guarding or rebound.   Musculoskeletal:      Cervical back: Neck supple.      Right lower leg: No edema.      Left lower leg: No edema.   Lymphadenopathy:      Cervical: No cervical adenopathy.   Skin:     General: Skin is warm and dry.   Neurological:      General: No focal deficit present.      Mental Status: He is alert and oriented to person, place, and time.   Psychiatric:         Mood and Affect: Mood normal.         Behavior: Behavior normal.          Last Recorded Vitals  Blood pressure 129/90, pulse 99, temperature 36.5 °C (97.7 °F), temperature source  "Tympanic, resp. rate 18, height 1.753 m (5' 9\"), weight 93.5 kg (206 lb 3.2 oz), SpO2 95%.    Relevant Results   Latest Reference Range & Units 04/08/25 00:30 04/08/25 01:29   GLUCOSE 74 - 99 mg/dL 94    SODIUM 136 - 145 mmol/L 139    POTASSIUM 3.5 - 5.3 mmol/L 3.7    CHLORIDE 98 - 107 mmol/L 106    Bicarbonate 21 - 32 mmol/L 23    Anion Gap 10 - 20 mmol/L 14    Blood Urea Nitrogen 6 - 23 mg/dL 13    Creatinine 0.50 - 1.30 mg/dL 0.77    EGFR >60 mL/min/1.73m*2 >90    Calcium 8.6 - 10.3 mg/dL 8.7    MAGNESIUM 1.60 - 2.40 mg/dL 1.96    BNP 0 - 99 pg/mL 346 (H)    Troponin I, High Sensitivity 0 - 20 ng/L 5 5   D-Dimer, Quantitative VTE Exclusion <=500 ng/mL FEU <215    WBC 4.4 - 11.3 x10*3/uL 6.1    nRBC 0.0 - 0.0 /100 WBCs 0.0    RBC 4.50 - 5.90 x10*6/uL 5.59    HEMOGLOBIN 13.5 - 17.5 g/dL 16.1    HEMATOCRIT 41.0 - 52.0 % 46.9    MCV 80 - 100 fL 84    MCH 26.0 - 34.0 pg 28.8    MCHC 32.0 - 36.0 g/dL 34.3    RED CELL DISTRIBUTION WIDTH 11.5 - 14.5 % 12.5    Platelets 150 - 450 x10*3/uL 198    Neutrophils % 40.0 - 80.0 % 52.5    Immature Granulocytes %, Automated 0.0 - 0.9 % 0.2    Lymphocytes % 13.0 - 44.0 % 33.3    Monocytes % 2.0 - 10.0 % 10.7    Eosinophils % 0.0 - 6.0 % 2.6    Basophils % 0.0 - 2.0 % 0.7    Neutrophils Absolute 1.20 - 7.70 x10*3/uL 3.18    Immature Granulocytes Absolute, Automated 0.00 - 0.70 x10*3/uL 0.01    Lymphocytes Absolute 1.20 - 4.80 x10*3/uL 2.02    Monocytes Absolute 0.10 - 1.00 x10*3/uL 0.65    Eosinophils Absolute 0.00 - 0.70 x10*3/uL 0.16    Basophils Absolute 0.00 - 0.10 x10*3/uL 0.04    Flu A Result Not Detected  Not Detected    Flu B Result Not Detected  Not Detected    Coronavirus 2019, PCR Not Detected  Not Detected    (H): Data is abnormally high    XR CHEST:     IMPRESSION:  1.  No evidence of acute cardiopulmonary process.    CTA CHEST:     IMPRESSION:  No evidence of aortic aneurysm or dissection.      Multiple enlarged right axillary lymph nodes which measure 2.3 cm x  1.6 cm " and multiple enlarged mediastinal and hilar lymph nodes and  numerous bilateral pulmonary nodules measure up to 8 mm, findings  highly concerning for malignancy/metastatic disease and further  evaluation and workup is recommended.      Prostatomegaly; please correlate with PSA.     Assessment/Plan   New onset atrial fibrillation with RVR  - Admit to SDU  - IV cardizem was attempted in ED but pt reacted adversely  - Will use digoxin for rate control  - IV metoprolol as needed  - CHADSVASC is 0 so no need for anticoagulation  - Check TSH, Mg, cardiac markers  - Check ECHO  - Cardiology to see    Bilateral pulmonary nodules with multiple axillary, mediastinal and hilar adenopathy  - Suspicious for malignancy with metastatic disease  - Lung appears to be primary  - Pulmonary consulted  - Oncology added to care team    Prostatomegaly  - Pt denies having any BPH or urinary symptoms  - Will check PSA      I spent 75 minutes in the professional and overall care of this patient.      Adri Morales MD

## 2025-04-08 NOTE — CONSULTS
"Inpatient consult to Cardiology  Consult performed by: Hero Garcia, CHEN-CNP  Consult ordered by: Adri Morales MD        History Of Present Illness:    Evens Shelby is a 44 y.o. male with PMH of pneumothorax, splenic injury after trauma presenting with palpitations x 4 days. Also notes increasing fatigue and SOB with exertion. He is on no medications at home.     Lab work shows , normal troponin x3, normal CBC, BMP, TSH, mag. EKG shows atrial fibrillation  bpm. He was given diltiazem 10 mg IV x1, metoprolol 5 mg IV x2, and loaded with digoxin. CT chest shows no evidence of aortic aneurysm or dissection, multiple enlarged right axillary lymph nodes and multiple enlarged mediastinal and hilar lymph nodes and numerous bilateral pulmonary nodules, findings highly concerning for malignancy/metastatic disease and further evaluation and workup is recommended.    Review of Systems   Constitutional:  Positive for fatigue.   Respiratory:  Positive for shortness of breath.    Cardiovascular:  Positive for chest pain and palpitations.   All other systems reviewed and are negative.      Last Recorded Vitals:  Vitals:    04/08/25 0408 04/08/25 0800 04/08/25 0801 04/08/25 0854   BP: 129/90  115/79    BP Location: Right arm  Left arm    Patient Position: Lying  Lying    Pulse: 99 103 58 (!) 140   Resp: 18  12    Temp: 36.5 °C (97.7 °F) 36.7 °C (98.1 °F) 36.7 °C (98.1 °F)    TempSrc: Tympanic  Temporal    SpO2: 95% 94% 94%    Weight: 93.5 kg (206 lb 3.2 oz)      Height: 1.753 m (5' 9\")          Last Labs:  CBC - 4/8/2025:  5:56 AM  6.1 16.1 203    46.8      CMP - 4/8/2025:  5:56 AM  8.7 _ _ --- _   _ _ _ _      PTT - No results in last year.  _   _ _     Troponin I, High Sensitivity   Date/Time Value Ref Range Status   04/08/2025 05:56 AM 4 0 - 20 ng/L Final   04/08/2025 01:29 AM 5 0 - 20 ng/L Final   04/08/2025 12:30 AM 5 0 - 20 ng/L Final     BNP   Date/Time Value Ref Range Status   04/08/2025 12:30 AM " "346 (H) 0 - 99 pg/mL Final      Last I/O:  No intake/output data recorded.    Past Cardiology Tests (Last 3 Years):  EKG:  ECG 12 Lead 04/08/2025 (Preliminary)    Echo:  No results found for this or any previous visit from the past 1095 days.    Ejection Fractions:  No results found for: \"EF\"  Cath:  No results found for this or any previous visit from the past 1095 days.    Stress Test:  No results found for this or any previous visit from the past 1095 days.    Cardiac Imaging:  No results found for this or any previous visit from the past 1095 days.      Past Medical History:  He has no past medical history on file.    Past Surgical History:  He has no past surgical history on file.      Social History:  He reports that he has never smoked. He has never used smokeless tobacco. No history on file for alcohol use and drug use.    Family History:  No family history on file.     Allergies:  Cardizem [diltiazem hcl]    Inpatient Medications:  Scheduled medications   Medication Dose Route Frequency    aspirin  81 mg oral Daily    metoprolol tartrate  25 mg oral 4x daily    oxygen   inhalation Continuous - Inhalation     PRN medications   Medication     Continuous Medications   Medication Dose Last Rate     Outpatient Medications:  No current outpatient medications    Physical Exam  Vitals reviewed.   Constitutional:       Appearance: Normal appearance. He is normal weight.   HENT:      Head: Normocephalic and atraumatic.   Neck:      Vascular: No carotid bruit or JVD.   Cardiovascular:      Rate and Rhythm: Tachycardia present. Rhythm regularly irregular.      Pulses: Normal pulses.      Heart sounds: Normal heart sounds.   Pulmonary:      Effort: Pulmonary effort is normal.      Breath sounds: Normal breath sounds.   Abdominal:      General: Abdomen is flat. Bowel sounds are normal.      Palpations: Abdomen is soft.   Skin:     General: Skin is warm and dry.   Neurological:      General: No focal deficit present.      " Mental Status: He is alert and oriented to person, place, and time. Mental status is at baseline.   Psychiatric:         Mood and Affect: Mood normal.         Behavior: Behavior normal.          Assessment/Plan   Assessment  44 y.o. male with PMH of pneumothorax, splenic injury after trauma presenting with palpitations x 4 days. Lab work shows normal troponin x3, normal CBC, BMP. EKG shows atrial fibrillation  bpm. CT chest shows no evidence of aortic aneurysm or dissection, multiple enlarged right axillary lymph nodes and multiple enlarged mediastinal and hilar lymph nodes and numerous bilateral pulmonary nodules, findings highly concerning for malignancy/metastatic disease and further evaluation and workup is recommended. Currently remains in atrial fibrillation.     [id number] Score  Age <65: 0   Sex Male: 0   CHF History No: 0   HTN No: 0   Stroke/TIA/Thromboembolism No: 0   Vascular Dz: CAD/PAD/Aortic Plaque No: 0   DM No: 0   Total Score 0     New onset atrial fibrillation with RVR  Enlarged lymph nodes, lung nodules    Plan  -TTE for structural and functional assessment  -Stop digoxin  -Begin metoprolol tartrate 25 mg po QID  -Begin heparin gtt  -Monitor telemetry  -Consult to pulmonology, hematology-oncology pending - may require biopsy  -Will follow, may consider DCC once determined if patient can be on uninterrupted OAC     Code Status:  Full Code        Hero Garcia, CHEN-CNP

## 2025-04-08 NOTE — CARE PLAN
The patient's goals for the shift include  pt to not have any chest pain this shift    The clinical goals for the shift include pt to maintain HR less than 120 this shift

## 2025-04-08 NOTE — CONSULTS
Inpatient consult to Pulmonology  Consult performed by: Jono Muhammad MD  Consult ordered by: Adir Morales MD  Reason for consult: lung nodules  Assessment/Recommendations: 45 yo man admitted with afib, noted to have bilateral lung nodules    Assessment & Plan  Lung nodules  Up to 8mm.  Inflammatory versus malignancy.  Has more of inflammatory appearance.  Plan to repeat CT-chest in 3 months as outpatient.    Mediastinal adenopathy  Inflammatory versus malignancy.  follow-up plan w/ CT as above.      Pt should follow-up with me in 1 month.    Will sign off at this time.  Please reconsult if there are any further questions.           Reason For Consult  lung nodules     History Of Present Illness  Evens Shelby is a 44 y.o. male presenting with palpitations.  H/o previous trauma history w/ PTX.  During workup, noted to have mediastinal lymphadenopathy and bilateral lung nodules.  No fevers, chills or cough.  No night sweats.  Some weigh loss in the past month (4lbs) he attributes to feeling ill.  ET is normally unrestricted.    Pt is a never smoker.  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  Has dog and horses.  Father had lung ca (reportedly non smoker).     Past Medical History  History reviewed. No pertinent past medical history.    Surgical History  History reviewed. No pertinent surgical history.     Social History  Social History     Tobacco Use    Smoking status: Never    Smokeless tobacco: Never       Family History  No family history on file.     Allergies  Cardizem [diltiazem hcl]    Review of Systems   Constitutional:  Negative for chills and fever.   Respiratory:  Positive for shortness of breath. Negative for cough.    Cardiovascular:  Positive for palpitations.   Gastrointestinal: Negative.    Skin:  Negative for rash.   Neurological: Negative.    Psychiatric/Behavioral: Negative.     All other systems reviewed and are negative.       Physical Exam  Vitals reviewed.   Constitutional:       Appearance: Normal  "appearance.   HENT:      Head: Normocephalic and atraumatic.   Eyes:      Extraocular Movements: Extraocular movements intact.   Cardiovascular:      Rate and Rhythm: Normal rate and regular rhythm.      Heart sounds: Normal heart sounds.   Pulmonary:      Effort: Pulmonary effort is normal.      Breath sounds: Normal breath sounds.   Abdominal:      Palpations: Abdomen is soft.      Tenderness: There is no abdominal tenderness.   Musculoskeletal:      Cervical back: Normal range of motion.   Skin:     General: Skin is warm.   Neurological:      General: No focal deficit present.      Mental Status: He is alert and oriented to person, place, and time. Mental status is at baseline.   Psychiatric:         Mood and Affect: Mood normal.         Behavior: Behavior normal.          Vital Signs  Blood pressure 117/86, pulse 100, temperature 36.6 °C (97.9 °F), temperature source Temporal, resp. rate 16, height 1.753 m (5' 9\"), weight 93.5 kg (206 lb 3.2 oz), SpO2 94%.  Oxygen Therapy  SpO2: 94 %  Medical Gas Therapy: None (Room air)          Relevant Results  XR chest 1 view 04/08/2025    Narrative  Interpreted By:  Washington Agosto,  STUDY:  XR CHEST 1 VIEW;  4/8/2025 1:01 am    INDICATION:  Signs/Symptoms:Chest pain.      COMPARISON:  01/2011    ACCESSION NUMBER(S):  [id number]    ORDERING CLINICIAN:  SHIRA SERNA    FINDINGS:          CARDIOMEDIASTINAL SILHOUETTE:  Cardiomediastinal silhouette is normal in size and configuration.    LUNGS:  Lungs are clear.    ABDOMEN:  No remarkable upper abdominal findings.    BONES:  No acute osseous changes.    Impression  1.  No evidence of acute cardiopulmonary process.        MACRO:  None    Signed by: Washington Agosto 4/8/2025 1:35 AM  Dictation workstation:   [id number]    Scheduled medications  aspirin, 81 mg, oral, Daily  metoprolol tartrate, 25 mg, oral, 4x daily      Continuous medications  heparin, 0-4,500 Units/hr, Last Rate: 1,500 Units/hr (04/08/25 1838)      PRN " medications  PRN medications: heparin, metoprolol, oxygen    Results for orders placed or performed during the hospital encounter of 04/08/25 (from the past 24 hours)   CBC and Auto Differential   Result Value Ref Range    WBC 6.1 4.4 - 11.3 x10*3/uL    nRBC 0.0 0.0 - 0.0 /100 WBCs    RBC 5.59 4.50 - 5.90 x10*6/uL    Hemoglobin 16.1 13.5 - 17.5 g/dL    Hematocrit 46.9 41.0 - 52.0 %    MCV 84 80 - 100 fL    MCH 28.8 26.0 - 34.0 pg    MCHC 34.3 32.0 - 36.0 g/dL    RDW 12.5 11.5 - 14.5 %    Platelets 198 150 - 450 x10*3/uL    Neutrophils % 52.5 40.0 - 80.0 %    Immature Granulocytes %, Automated 0.2 0.0 - 0.9 %    Lymphocytes % 33.3 13.0 - 44.0 %    Monocytes % 10.7 2.0 - 10.0 %    Eosinophils % 2.6 0.0 - 6.0 %    Basophils % 0.7 0.0 - 2.0 %    Neutrophils Absolute 3.18 1.20 - 7.70 x10*3/uL    Immature Granulocytes Absolute, Automated 0.01 0.00 - 0.70 x10*3/uL    Lymphocytes Absolute 2.02 1.20 - 4.80 x10*3/uL    Monocytes Absolute 0.65 0.10 - 1.00 x10*3/uL    Eosinophils Absolute 0.16 0.00 - 0.70 x10*3/uL    Basophils Absolute 0.04 0.00 - 0.10 x10*3/uL   Basic Metabolic Panel   Result Value Ref Range    Glucose 94 74 - 99 mg/dL    Sodium 139 136 - 145 mmol/L    Potassium 3.7 3.5 - 5.3 mmol/L    Chloride 106 98 - 107 mmol/L    Bicarbonate 23 21 - 32 mmol/L    Anion Gap 14 10 - 20 mmol/L    Urea Nitrogen 13 6 - 23 mg/dL    Creatinine 0.77 0.50 - 1.30 mg/dL    eGFR >90 >60 mL/min/1.73m*2    Calcium 8.7 8.6 - 10.3 mg/dL   B-Type Natriuretic Peptide   Result Value Ref Range     (H) 0 - 99 pg/mL   Influenza A, and B PCR   Result Value Ref Range    Flu A Result Not Detected Not Detected    Flu B Result Not Detected Not Detected   Sars-CoV-2 PCR   Result Value Ref Range    Coronavirus 2019, PCR Not Detected Not Detected   Magnesium   Result Value Ref Range    Magnesium 1.96 1.60 - 2.40 mg/dL   Troponin I, High Sensitivity, Initial   Result Value Ref Range    Troponin I, High Sensitivity 5 0 - 20 ng/L   D-Dimer, VTE  Exclusion   Result Value Ref Range    D-Dimer, Quantitative VTE Exclusion <215 <=500 ng/mL FEU   ECG 12 Lead   Result Value Ref Range    Ventricular Rate 122 BPM    QRS Duration 82 ms    QT Interval 314 ms    QTC Calculation(Bazett) 447 ms    R Axis 45 degrees    T Axis 42 degrees    QRS Count 20 beats    Q Onset 220 ms    T Offset 377 ms    QTC Fredericia 397 ms   Troponin, High Sensitivity, 1 Hour   Result Value Ref Range    Troponin I, High Sensitivity 5 0 - 20 ng/L   Thyroid Stimulating Hormone   Result Value Ref Range    Thyroid Stimulating Hormone 2.57 0.44 - 3.98 mIU/L   CBC   Result Value Ref Range    WBC 6.1 4.4 - 11.3 x10*3/uL    nRBC 0.0 0.0 - 0.0 /100 WBCs    RBC 5.49 4.50 - 5.90 x10*6/uL    Hemoglobin 16.1 13.5 - 17.5 g/dL    Hematocrit 46.8 41.0 - 52.0 %    MCV 85 80 - 100 fL    MCH 29.3 26.0 - 34.0 pg    MCHC 34.4 32.0 - 36.0 g/dL    RDW 12.5 11.5 - 14.5 %    Platelets 203 150 - 450 x10*3/uL   Basic Metabolic Panel   Result Value Ref Range    Glucose 121 (H) 74 - 99 mg/dL    Sodium 138 136 - 145 mmol/L    Potassium 4.0 3.5 - 5.3 mmol/L    Chloride 107 98 - 107 mmol/L    Bicarbonate 24 21 - 32 mmol/L    Anion Gap 11 10 - 20 mmol/L    Urea Nitrogen 13 6 - 23 mg/dL    Creatinine 0.74 0.50 - 1.30 mg/dL    eGFR >90 >60 mL/min/1.73m*2    Calcium 8.7 8.6 - 10.3 mg/dL   Magnesium   Result Value Ref Range    Magnesium 2.06 1.60 - 2.40 mg/dL   Troponin I, High Sensitivity   Result Value Ref Range    Troponin I, High Sensitivity 4 0 - 20 ng/L   Prostate Specific Antigen, Screen   Result Value Ref Range    Prostate Specific Antigen,Screen 2.47 <=4.00 ng/mL   ECG 12 Lead   Result Value Ref Range    Ventricular Rate 128 BPM    QRS Duration 84 ms    QT Interval 304 ms    QTC Calculation(Bazett) 443 ms    R Axis 44 degrees    T Axis 21 degrees    QRS Count 21 beats    Q Onset 221 ms    T Offset 373 ms    QTC Fredericia 391 ms   aPTT - baseline   Result Value Ref Range    aPTT 29 26 - 36 seconds   Transthoracic Echo  (TTE) Complete   Result Value Ref Range    BSA 2.13 m2   Heparin Assay, UFH   Result Value Ref Range    Heparin Unfractionated 0.8 See Comment Below for Therapeutic Ranges IU/mL         Assessment/Plan     45 yo man admitted with afib, noted to have bilateral lung nodules    Assessment & Plan  Lung nodules  Up to 8mm.  Inflammatory versus malignancy.  Has more of inflammatory appearance.  Plan to repeat CT-chest in 3 months as outpatient.  Mediastinal adenopathy  Inflammatory versus malignancy.  follow-up plan w/ CT as above.    Pt should follow-up with me in 1 month.    Will sign off at this time.  Please reconsult if there are any further questions.       Jono Muhammad MD

## 2025-04-08 NOTE — ED TRIAGE NOTES
Pt arrived to ED by EMS from home for complaints of chest pain. Pt states for 4 days he has had chest pain, pt states it starts in the left chest and it was on and off, but it has gotten more consistent and worsens when he lifts his left arm. Pt took 4 ASA prior to arrival of EMS. Pt denies cardiac or medical history. He endorses on and off shortness of breath and as well as nausea.

## 2025-04-08 NOTE — PROGRESS NOTES
Eevns Shelby is a 44 y.o. male on day 0 of admission presenting with Atrial fibrillation with RVR (Multi).      Subjective   At bedside, pt denies current palpitations, chest pain, lightheadedness, dizziness.  States that he was having a racing heart for the past 4 days, which prompted his ED visit. Also with lightheadedness and shortness of breath.    Denies tobacco use. Denies cough, unintentional weight loss.     Family history: mother with A-fib, pt states she required cardioversion          Objective     Last Recorded Vitals  /79 (BP Location: Left arm, Patient Position: Lying)   Pulse (!) 140   Temp 36.7 °C (98.1 °F) (Temporal)   Resp 12   Wt 93.5 kg (206 lb 3.2 oz)   SpO2 94%   Intake/Output last 3 Shifts:    Intake/Output Summary (Last 24 hours) at 4/8/2025 0950  Last data filed at 4/8/2025 0841  Gross per 24 hour   Intake 240 ml   Output 0 ml   Net 240 ml       Admission Weight  Weight: 95.3 kg (210 lb) (04/08/25 0029)    Daily Weight  04/08/25 : 93.5 kg (206 lb 3.2 oz)    Image Results  ECG 12 Lead  Atrial fibrillation with rapid ventricular response  Abnormal ECG  When compared with ECG of 24-JAN-2010 18:29,  Atrial fibrillation has replaced Sinus rhythm  CT angio chest abdomen pelvis  Narrative: Interpreted By:  Brennan Millard,   STUDY:  CT ANGIO CHEST ABDOMEN PELVIS;  4/8/2025 1:37 am      INDICATION:  Signs/Symptoms:concern for dissection.      COMPARISON:  None.      ACCESSION NUMBER(S):  [id number]      ORDERING CLINICIAN:  SHIRA SERNA      TECHNIQUE:  Contiguous axial images of the chest, abdomen and pelvis were  obtained with and without intravenous contrast. Coronal and sagittal  reformatted images were obtained from the axial images. MIPS were  also performed and reviewed.      FINDINGS:  CT CHEST:      There are multiple enlarged right axillary lymph nodes which measure  up to 2.3 cm x 1.6 cm. There also multiple enlarged mediastinal and  hilar lymph nodes. 1.70.2 cm right superior  mediastinal lymph node  and right paratracheal lymph nodes which measure up to 1.5 cm. 1.9 cm  x 1.5 cm subcarinal lymph node. 1.7 cm and 1.2 cm right hilar lymph  nodes. Left hilar lymph nodes measure up to 1.4 cm.      The heart is normal in size. No significant pericardial effusion. The  no evidence of aortic aneurysm or dissection. The ascending aorta  measures 3.3 cm in diameter.      There are multiple bilateral pulmonary nodules which measure up to 8  mm in the right lung and 8 mm in the left lung. No significant  pleural effusion. No pneumothorax.      Chronic fracture deformity of right 4th and 5th ribs.      No acute fracture of the thoracic spine.      CT ABDOMEN PELVIS:      No evidence of liver mass. The gallbladder is present. No dilatation  common bile duct.      The pancreas, spleen, and adrenal glands appear unremarkable.      Symmetric enhancement of the kidneys.  No hydronephrosis.      No evidence of abdominal aortic aneurysm or dissection. The celiac  artery, superior mesenteric artery, bilateral renal arteries, and the  inferior mesenteric artery are patent. Metallic density in the left  upper abdomen may relate to prior procedure.      Nodes bowel obstruction or acute appendicitis.      Urinary bladder is underdistended and not well evaluated.      Prostatomegaly.      No acute fracture of the lumbar spine.      Impression: No evidence of aortic aneurysm or dissection.      Multiple enlarged right axillary lymph nodes which measure 2.3 cm x  1.6 cm and multiple enlarged mediastinal and hilar lymph nodes and  numerous bilateral pulmonary nodules measure up to 8 mm, findings  highly concerning for malignancy/metastatic disease and further  evaluation and workup is recommended.      Prostatomegaly; please correlate with PSA.      MACRO:  Brennan Millard discussed the significance and urgency of this critical  finding by telephone with  SHIRA SERNA on 4/8/2025 at 2:32 am.  (**-RCF-**) Findings:  See  findings.      Signed by: Brennan Millard 4/8/2025 2:39 AM  Dictation workstation:   [id number]  XR chest 1 view  Narrative: Interpreted By:  Washington Agosto,   STUDY:  XR CHEST 1 VIEW;  4/8/2025 1:01 am      INDICATION:  Signs/Symptoms:Chest pain.          COMPARISON:  01/2011      ACCESSION NUMBER(S):  [id number]      ORDERING CLINICIAN:  SHIRA SERNA      FINDINGS:                  CARDIOMEDIASTINAL SILHOUETTE:  Cardiomediastinal silhouette is normal in size and configuration.      LUNGS:  Lungs are clear.      ABDOMEN:  No remarkable upper abdominal findings.      BONES:  No acute osseous changes.      Impression: 1.  No evidence of acute cardiopulmonary process.              MACRO:  None      Signed by: Washington Agosto 4/8/2025 1:35 AM  Dictation workstation:   [id number]      Physical Exam  Constitutional:       General: He is not in acute distress.     Appearance: Normal appearance. He is not ill-appearing or toxic-appearing.   HENT:      Nose: No rhinorrhea.   Cardiovascular:      Rate and Rhythm: Tachycardia present. Rhythm irregular.      Heart sounds: No murmur heard.  Pulmonary:      Effort: Pulmonary effort is normal. No respiratory distress.      Breath sounds: Normal breath sounds. No wheezing.   Abdominal:      General: Abdomen is flat.      Palpations: Abdomen is soft.      Tenderness: There is no abdominal tenderness. There is no guarding or rebound.   Musculoskeletal:         General: No deformity.      Right lower leg: No edema.      Left lower leg: No edema.   Skin:     General: Skin is warm and dry.      Findings: No rash.   Neurological:      General: No focal deficit present.      Mental Status: He is alert and oriented to person, place, and time.      Motor: No weakness.   Psychiatric:         Mood and Affect: Mood normal.         Behavior: Behavior normal.         Relevant Results    Scheduled medications  aspirin, 81 mg, oral, Daily  metoprolol tartrate, 25 mg, oral, 4x daily  oxygen, ,  inhalation, Continuous - Inhalation      Continuous medications     PRN medications      Results for orders placed or performed during the hospital encounter of 04/08/25 (from the past 24 hours)   CBC and Auto Differential   Result Value Ref Range    WBC 6.1 4.4 - 11.3 x10*3/uL    nRBC 0.0 0.0 - 0.0 /100 WBCs    RBC 5.59 4.50 - 5.90 x10*6/uL    Hemoglobin 16.1 13.5 - 17.5 g/dL    Hematocrit 46.9 41.0 - 52.0 %    MCV 84 80 - 100 fL    MCH 28.8 26.0 - 34.0 pg    MCHC 34.3 32.0 - 36.0 g/dL    RDW 12.5 11.5 - 14.5 %    Platelets 198 150 - 450 x10*3/uL    Neutrophils % 52.5 40.0 - 80.0 %    Immature Granulocytes %, Automated 0.2 0.0 - 0.9 %    Lymphocytes % 33.3 13.0 - 44.0 %    Monocytes % 10.7 2.0 - 10.0 %    Eosinophils % 2.6 0.0 - 6.0 %    Basophils % 0.7 0.0 - 2.0 %    Neutrophils Absolute 3.18 1.20 - 7.70 x10*3/uL    Immature Granulocytes Absolute, Automated 0.01 0.00 - 0.70 x10*3/uL    Lymphocytes Absolute 2.02 1.20 - 4.80 x10*3/uL    Monocytes Absolute 0.65 0.10 - 1.00 x10*3/uL    Eosinophils Absolute 0.16 0.00 - 0.70 x10*3/uL    Basophils Absolute 0.04 0.00 - 0.10 x10*3/uL   Basic Metabolic Panel   Result Value Ref Range    Glucose 94 74 - 99 mg/dL    Sodium 139 136 - 145 mmol/L    Potassium 3.7 3.5 - 5.3 mmol/L    Chloride 106 98 - 107 mmol/L    Bicarbonate 23 21 - 32 mmol/L    Anion Gap 14 10 - 20 mmol/L    Urea Nitrogen 13 6 - 23 mg/dL    Creatinine 0.77 0.50 - 1.30 mg/dL    eGFR >90 >60 mL/min/1.73m*2    Calcium 8.7 8.6 - 10.3 mg/dL   B-Type Natriuretic Peptide   Result Value Ref Range     (H) 0 - 99 pg/mL   Influenza A, and B PCR   Result Value Ref Range    Flu A Result Not Detected Not Detected    Flu B Result Not Detected Not Detected   Sars-CoV-2 PCR   Result Value Ref Range    Coronavirus 2019, PCR Not Detected Not Detected   Magnesium   Result Value Ref Range    Magnesium 1.96 1.60 - 2.40 mg/dL   Troponin I, High Sensitivity, Initial   Result Value Ref Range    Troponin I, High Sensitivity 5 0 -  20 ng/L   D-Dimer, VTE Exclusion   Result Value Ref Range    D-Dimer, Quantitative VTE Exclusion <215 <=500 ng/mL FEU   ECG 12 Lead   Result Value Ref Range    Ventricular Rate 122 BPM    QRS Duration 82 ms    QT Interval 314 ms    QTC Calculation(Bazett) 447 ms    R Axis 45 degrees    T Axis 42 degrees    QRS Count 20 beats    Q Onset 220 ms    T Offset 377 ms    QTC Fredericia 397 ms   Troponin, High Sensitivity, 1 Hour   Result Value Ref Range    Troponin I, High Sensitivity 5 0 - 20 ng/L   Thyroid Stimulating Hormone   Result Value Ref Range    Thyroid Stimulating Hormone 2.57 0.44 - 3.98 mIU/L   CBC   Result Value Ref Range    WBC 6.1 4.4 - 11.3 x10*3/uL    nRBC 0.0 0.0 - 0.0 /100 WBCs    RBC 5.49 4.50 - 5.90 x10*6/uL    Hemoglobin 16.1 13.5 - 17.5 g/dL    Hematocrit 46.8 41.0 - 52.0 %    MCV 85 80 - 100 fL    MCH 29.3 26.0 - 34.0 pg    MCHC 34.4 32.0 - 36.0 g/dL    RDW 12.5 11.5 - 14.5 %    Platelets 203 150 - 450 x10*3/uL   Basic Metabolic Panel   Result Value Ref Range    Glucose 121 (H) 74 - 99 mg/dL    Sodium 138 136 - 145 mmol/L    Potassium 4.0 3.5 - 5.3 mmol/L    Chloride 107 98 - 107 mmol/L    Bicarbonate 24 21 - 32 mmol/L    Anion Gap 11 10 - 20 mmol/L    Urea Nitrogen 13 6 - 23 mg/dL    Creatinine 0.74 0.50 - 1.30 mg/dL    eGFR >90 >60 mL/min/1.73m*2    Calcium 8.7 8.6 - 10.3 mg/dL   Magnesium   Result Value Ref Range    Magnesium 2.06 1.60 - 2.40 mg/dL   Troponin I, High Sensitivity   Result Value Ref Range    Troponin I, High Sensitivity 4 0 - 20 ng/L         ECG 12 Lead    Result Date: 4/8/2025  Atrial fibrillation with rapid ventricular response Abnormal ECG When compared with ECG of 24-JAN-2010 18:29, Atrial fibrillation has replaced Sinus rhythm    CT angio chest abdomen pelvis    Result Date: 4/8/2025  Interpreted By:  Brennan Millard, STUDY: CT ANGIO CHEST ABDOMEN PELVIS;  4/8/2025 1:37 am   INDICATION: Signs/Symptoms:concern for dissection.   COMPARISON: None.   ACCESSION NUMBER(S):  [id number]   ORDERING CLINICIAN: SHIRA SERNA   TECHNIQUE: Contiguous axial images of the chest, abdomen and pelvis were obtained with and without intravenous contrast. Coronal and sagittal reformatted images were obtained from the axial images. MIPS were also performed and reviewed.   FINDINGS: CT CHEST:   There are multiple enlarged right axillary lymph nodes which measure up to 2.3 cm x 1.6 cm. There also multiple enlarged mediastinal and hilar lymph nodes. 1.70.2 cm right superior mediastinal lymph node and right paratracheal lymph nodes which measure up to 1.5 cm. 1.9 cm x 1.5 cm subcarinal lymph node. 1.7 cm and 1.2 cm right hilar lymph nodes. Left hilar lymph nodes measure up to 1.4 cm.   The heart is normal in size. No significant pericardial effusion. The no evidence of aortic aneurysm or dissection. The ascending aorta measures 3.3 cm in diameter.   There are multiple bilateral pulmonary nodules which measure up to 8 mm in the right lung and 8 mm in the left lung. No significant pleural effusion. No pneumothorax.   Chronic fracture deformity of right 4th and 5th ribs.   No acute fracture of the thoracic spine.   CT ABDOMEN PELVIS:   No evidence of liver mass. The gallbladder is present. No dilatation common bile duct.   The pancreas, spleen, and adrenal glands appear unremarkable.   Symmetric enhancement of the kidneys. No hydronephrosis.   No evidence of abdominal aortic aneurysm or dissection. The celiac artery, superior mesenteric artery, bilateral renal arteries, and the inferior mesenteric artery are patent. Metallic density in the left upper abdomen may relate to prior procedure.   Nodes bowel obstruction or acute appendicitis.   Urinary bladder is underdistended and not well evaluated.   Prostatomegaly.   No acute fracture of the lumbar spine.       No evidence of aortic aneurysm or dissection.   Multiple enlarged right axillary lymph nodes which measure 2.3 cm x 1.6 cm and multiple enlarged  mediastinal and hilar lymph nodes and numerous bilateral pulmonary nodules measure up to 8 mm, findings highly concerning for malignancy/metastatic disease and further evaluation and workup is recommended.   Prostatomegaly; please correlate with PSA.   MACRO: Brennan Millard discussed the significance and urgency of this critical finding by telephone with  SHIRA SERNA on 4/8/2025 at 2:32 am. (**-RCF-**) Findings:  See findings.   Signed by: Brennan Millard 4/8/2025 2:39 AM Dictation workstation:   [id number]    XR chest 1 view    Result Date: 4/8/2025  Interpreted By:  Washington Agosto, STUDY: XR CHEST 1 VIEW;  4/8/2025 1:01 am   INDICATION: Signs/Symptoms:Chest pain.     COMPARISON: 01/2011   ACCESSION NUMBER(S): [id number]   ORDERING CLINICIAN: SHIRA SERNA   FINDINGS:         CARDIOMEDIASTINAL SILHOUETTE: Cardiomediastinal silhouette is normal in size and configuration.   LUNGS: Lungs are clear.   ABDOMEN: No remarkable upper abdominal findings.   BONES: No acute osseous changes.       1.  No evidence of acute cardiopulmonary process.       MACRO: None   Signed by: Washington Agosto 4/8/2025 1:35 AM Dictation workstation:   [id number]         Assessment/Plan      Evens Shelby is a 44 yr old male with PMH of pneumothorax, splenic injury, right eye open globe injury, right ocular HTN, and right aphakia who presented to the ED with chief complaint of palpitations x4 days, SOB with exertion, lightheadedness, and increasing fatigue. Admitted on 4/8 with A-fib RVR.     #New onset A-fib with RVR  -HR noted to be 140bpm at 0854 today  -Continue to monitor vitals, including HR  -Advised pt that if he develops palpitations, racing heart, lightheadedness, SOB, chest pain, please immediately let nurse know.   -BNP elevated 346  -Troponinx3, calcium, Mg, TSH WNL   -CHADSVASC: 0  -IV cardizem was attempted in ED but pt reacted adversely  -Cardiology consulted  -Metoprolol tartrate 25mg QID PO for rate control   -Heparin drip   -TTE  results pending   -May consider cardioversion  -NPO from midnight as cardioversion likely tomorrow if no rate control with Metoprolol   -Tele monitoring     #Bilateral pulmonary nodules with multiple axillary, mediastinal and hilar adenopathy  -Suspicious for malignancy with metastatic disease per radiology read  -No prior CT chest for comparison   -Pulmonary consulted  -Heme/onc consulted - recommend repeat imaging in 6 months and outpatient follow up   -Will consider outpatient pulm/PCP follow up in 1 month and repeat imaging in 2 months. Pt and wife state that they would prefer repeat CT scan earlier than 6 months.     #Prostatomegaly  -Pt denies having any BPH or urinary symptoms  -PSA WNL    Code status: full code    Case discussed with Dr. Cassidy.     Gelacio Aquino DO  PGY-1, Family Medicine  [facility]

## 2025-04-09 ENCOUNTER — ANESTHESIA (OUTPATIENT)
Dept: CARDIOLOGY | Facility: HOSPITAL | Age: 45
End: 2025-04-09
Payer: COMMERCIAL

## 2025-04-09 ENCOUNTER — APPOINTMENT (OUTPATIENT)
Dept: CARDIOLOGY | Facility: HOSPITAL | Age: 45
End: 2025-04-09
Payer: COMMERCIAL

## 2025-04-09 ENCOUNTER — ANESTHESIA EVENT (OUTPATIENT)
Dept: CARDIOLOGY | Facility: HOSPITAL | Age: 45
End: 2025-04-09
Payer: COMMERCIAL

## 2025-04-09 LAB
ALBUMIN SERPL BCP-MCNC: 3.6 G/DL (ref 3.4–5)
ALP SERPL-CCNC: 82 U/L (ref 33–120)
ALT SERPL W P-5'-P-CCNC: 19 U/L (ref 10–52)
ANION GAP SERPL CALC-SCNC: 11 MMOL/L (ref 10–20)
AORTIC VALVE MEAN GRADIENT: 5 MMHG
AORTIC VALVE PEAK VELOCITY: 1.55 M/S
AST SERPL W P-5'-P-CCNC: 10 U/L (ref 9–39)
AV PEAK GRADIENT: 10 MMHG
AVA (PEAK VEL): 3.27 CM2
AVA (VTI): 3.36 CM2
BILIRUB SERPL-MCNC: 0.4 MG/DL (ref 0–1.2)
BUN SERPL-MCNC: 17 MG/DL (ref 6–23)
CALCIUM SERPL-MCNC: 8.3 MG/DL (ref 8.6–10.3)
CHLORIDE SERPL-SCNC: 109 MMOL/L (ref 98–107)
CO2 SERPL-SCNC: 23 MMOL/L (ref 21–32)
CREAT SERPL-MCNC: 0.72 MG/DL (ref 0.5–1.3)
EGFRCR SERPLBLD CKD-EPI 2021: >90 ML/MIN/1.73M*2
EJECTION FRACTION APICAL 4 CHAMBER: 62.3
EJECTION FRACTION: 63 %
EJECTION FRACTION: 68 %
GLUCOSE SERPL-MCNC: 123 MG/DL (ref 74–99)
LEFT ATRIUM VOLUME AREA LENGTH INDEX BSA: 20.2 ML/M2
LEFT VENTRICLE INTERNAL DIMENSION DIASTOLE: 4.28 CM (ref 3.5–6)
LEFT VENTRICULAR OUTFLOW TRACT DIAMETER: 2.33 CM
POTASSIUM SERPL-SCNC: 3.5 MMOL/L (ref 3.5–5.3)
PROT SERPL-MCNC: 6.2 G/DL (ref 6.4–8.2)
RIGHT VENTRICLE FREE WALL PEAK S': 18 CM/S
RIGHT VENTRICLE PEAK SYSTOLIC PRESSURE: 19.3 MMHG
SODIUM SERPL-SCNC: 139 MMOL/L (ref 136–145)
TRICUSPID ANNULAR PLANE SYSTOLIC EXCURSION: 2 CM
UFH PPP CHRO-ACNC: 0.3 IU/ML
UFH PPP CHRO-ACNC: 0.3 IU/ML

## 2025-04-09 PROCEDURE — 2500000001 HC RX 250 WO HCPCS SELF ADMINISTERED DRUGS (ALT 637 FOR MEDICARE OP): Performed by: INTERNAL MEDICINE

## 2025-04-09 PROCEDURE — 36415 COLL VENOUS BLD VENIPUNCTURE: CPT | Performed by: NURSE PRACTITIONER

## 2025-04-09 PROCEDURE — 93005 ELECTROCARDIOGRAM TRACING: CPT

## 2025-04-09 PROCEDURE — 36415 COLL VENOUS BLD VENIPUNCTURE: CPT

## 2025-04-09 PROCEDURE — RXMED WILLOW AMBULATORY MEDICATION CHARGE

## 2025-04-09 PROCEDURE — 93312 ECHO TRANSESOPHAGEAL: CPT | Performed by: STUDENT IN AN ORGANIZED HEALTH CARE EDUCATION/TRAINING PROGRAM

## 2025-04-09 PROCEDURE — 99232 SBSQ HOSP IP/OBS MODERATE 35: CPT

## 2025-04-09 PROCEDURE — 93320 DOPPLER ECHO COMPLETE: CPT

## 2025-04-09 PROCEDURE — 2500000001 HC RX 250 WO HCPCS SELF ADMINISTERED DRUGS (ALT 637 FOR MEDICARE OP): Performed by: NURSE PRACTITIONER

## 2025-04-09 PROCEDURE — 85520 HEPARIN ASSAY: CPT | Performed by: NURSE PRACTITIONER

## 2025-04-09 PROCEDURE — 2060000001 HC INTERMEDIATE ICU ROOM DAILY

## 2025-04-09 PROCEDURE — 3700000002 HC GENERAL ANESTHESIA TIME - EACH INCREMENTAL 1 MINUTE

## 2025-04-09 PROCEDURE — 7100000010 HC PHASE TWO TIME - EACH INCREMENTAL 1 MINUTE

## 2025-04-09 PROCEDURE — 2500000002 HC RX 250 W HCPCS SELF ADMINISTERED DRUGS (ALT 637 FOR MEDICARE OP, ALT 636 FOR OP/ED): Performed by: FAMILY MEDICINE

## 2025-04-09 PROCEDURE — 92960 CARDIOVERSION ELECTRIC EXT: CPT | Performed by: STUDENT IN AN ORGANIZED HEALTH CARE EDUCATION/TRAINING PROGRAM

## 2025-04-09 PROCEDURE — A93312 PR ECHO HEART,TRANSESOPHAGEAL,COMPLETE: Performed by: ANESTHESIOLOGY

## 2025-04-09 PROCEDURE — 92960 CARDIOVERSION ELECTRIC EXT: CPT

## 2025-04-09 PROCEDURE — 2500000005 HC RX 250 GENERAL PHARMACY W/O HCPCS: Performed by: STUDENT IN AN ORGANIZED HEALTH CARE EDUCATION/TRAINING PROGRAM

## 2025-04-09 PROCEDURE — 84075 ASSAY ALKALINE PHOSPHATASE: CPT

## 2025-04-09 PROCEDURE — 93325 DOPPLER ECHO COLOR FLOW MAPG: CPT | Performed by: STUDENT IN AN ORGANIZED HEALTH CARE EDUCATION/TRAINING PROGRAM

## 2025-04-09 PROCEDURE — 3700000001 HC GENERAL ANESTHESIA TIME - INITIAL BASE CHARGE

## 2025-04-09 PROCEDURE — 2500000004 HC RX 250 GENERAL PHARMACY W/ HCPCS (ALT 636 FOR OP/ED): Performed by: NURSE ANESTHETIST, CERTIFIED REGISTERED

## 2025-04-09 PROCEDURE — A93312 PR ECHO HEART,TRANSESOPHAGEAL,COMPLETE: Performed by: NURSE ANESTHETIST, CERTIFIED REGISTERED

## 2025-04-09 PROCEDURE — 5A2204Z RESTORATION OF CARDIAC RHYTHM, SINGLE: ICD-10-PCS | Performed by: STUDENT IN AN ORGANIZED HEALTH CARE EDUCATION/TRAINING PROGRAM

## 2025-04-09 PROCEDURE — 99232 SBSQ HOSP IP/OBS MODERATE 35: CPT | Performed by: NURSE PRACTITIONER

## 2025-04-09 PROCEDURE — 94760 N-INVAS EAR/PLS OXIMETRY 1: CPT

## 2025-04-09 PROCEDURE — 93320 DOPPLER ECHO COMPLETE: CPT | Performed by: STUDENT IN AN ORGANIZED HEALTH CARE EDUCATION/TRAINING PROGRAM

## 2025-04-09 PROCEDURE — 93010 ELECTROCARDIOGRAM REPORT: CPT | Performed by: STUDENT IN AN ORGANIZED HEALTH CARE EDUCATION/TRAINING PROGRAM

## 2025-04-09 PROCEDURE — 7100000009 HC PHASE TWO TIME - INITIAL BASE CHARGE

## 2025-04-09 RX ORDER — LIDOCAINE HYDROCHLORIDE 20 MG/ML
SOLUTION OROPHARYNGEAL AS NEEDED
Status: DISCONTINUED | OUTPATIENT
Start: 2025-04-09 | End: 2025-04-09 | Stop reason: HOSPADM

## 2025-04-09 RX ORDER — PROPOFOL 10 MG/ML
INJECTION, EMULSION INTRAVENOUS AS NEEDED
Status: DISCONTINUED | OUTPATIENT
Start: 2025-04-09 | End: 2025-04-09

## 2025-04-09 RX ORDER — SODIUM CHLORIDE 9 MG/ML
INJECTION, SOLUTION INTRAVENOUS CONTINUOUS PRN
Status: DISCONTINUED | OUTPATIENT
Start: 2025-04-09 | End: 2025-04-09

## 2025-04-09 RX ORDER — MIDAZOLAM HYDROCHLORIDE 1 MG/ML
INJECTION, SOLUTION INTRAMUSCULAR; INTRAVENOUS AS NEEDED
Status: DISCONTINUED | OUTPATIENT
Start: 2025-04-09 | End: 2025-04-09

## 2025-04-09 RX ADMIN — SODIUM CHLORIDE: 900 INJECTION, SOLUTION INTRAVENOUS at 13:50

## 2025-04-09 RX ADMIN — LIDOCAINE HYDROCHLORIDE 1.25 ML: 20 SOLUTION ORAL; TOPICAL at 13:53

## 2025-04-09 RX ADMIN — BENZOCAINE, BUTAMBEN, AND TETRACAINE HYDROCHLORIDE 1 SPRAY: .028; .004; .004 AEROSOL, SPRAY TOPICAL at 13:53

## 2025-04-09 RX ADMIN — PROPOFOL 30 MG: 10 INJECTION, EMULSION INTRAVENOUS at 13:59

## 2025-04-09 RX ADMIN — ASPIRIN 81 MG: 81 TABLET, COATED ORAL at 09:03

## 2025-04-09 RX ADMIN — METOPROLOL TARTRATE 25 MG: 25 TABLET, FILM COATED ORAL at 20:53

## 2025-04-09 RX ADMIN — MIDAZOLAM 2 MG: 1 INJECTION INTRAMUSCULAR; INTRAVENOUS at 13:53

## 2025-04-09 RX ADMIN — APIXABAN 5 MG: 5 TABLET, FILM COATED ORAL at 10:24

## 2025-04-09 RX ADMIN — PROPOFOL 20 MG: 10 INJECTION, EMULSION INTRAVENOUS at 13:57

## 2025-04-09 RX ADMIN — RIVAROXABAN 20 MG: 20 TABLET, FILM COATED ORAL at 17:36

## 2025-04-09 RX ADMIN — METOPROLOL TARTRATE 25 MG: 25 TABLET, FILM COATED ORAL at 17:36

## 2025-04-09 RX ADMIN — PROPOFOL 60 MG: 10 INJECTION, EMULSION INTRAVENOUS at 13:55

## 2025-04-09 SDOH — HEALTH STABILITY: MENTAL HEALTH: CURRENT SMOKER: 0

## 2025-04-09 ASSESSMENT — COGNITIVE AND FUNCTIONAL STATUS - GENERAL
DAILY ACTIVITIY SCORE: 24
MOBILITY SCORE: 24
MOBILITY SCORE: 24
DAILY ACTIVITIY SCORE: 24

## 2025-04-09 ASSESSMENT — PAIN SCALES - GENERAL
PAINLEVEL_OUTOF10: 0 - NO PAIN
PAINLEVEL_OUTOF10: 0 - NO PAIN
PAIN_LEVEL: 0
PAINLEVEL_OUTOF10: 0 - NO PAIN

## 2025-04-09 ASSESSMENT — PAIN - FUNCTIONAL ASSESSMENT
PAIN_FUNCTIONAL_ASSESSMENT: 0-10

## 2025-04-09 NOTE — PROGRESS NOTES
Subjective Data:  Continues with CP, SOB, and palpitations    Overnight Events:    Remains in atrial fibrillation 90s-100s     Objective Data:  Last Recorded Vitals:  Vitals:    04/09/25 0611 04/09/25 0751 04/09/25 0800 04/09/25 0804   BP: 99/65 123/83     BP Location: Left arm      Patient Position: Lying      Pulse: 95 84 86    Resp: 18      Temp: 36.6 °C (97.9 °F) 36.7 °C (98.1 °F)     TempSrc: Temporal      SpO2: 95% 94% 97% 96%   Weight:       Height:           Last Labs:  CBC - 4/8/2025:  5:56 AM  6.1 16.1 203    46.8      CMP - 4/9/2025:  5:47 AM  8.3 6.2 10 --- 0.4   _ 3.6 19 82      PTT - 4/8/2025:  1:10 PM  _   _ 29     TROPHS   Date/Time Value Ref Range Status   04/08/2025 05:56 AM 4 0 - 20 ng/L Final   04/08/2025 01:29 AM 5 0 - 20 ng/L Final   04/08/2025 12:30 AM 5 0 - 20 ng/L Final     BNP   Date/Time Value Ref Range Status   04/08/2025 12:30  0 - 99 pg/mL Final      Last I/O:  I/O last 3 completed shifts:  In: 1156.8 (12.4 mL/kg) [P.O.:960; I.V.:196.8 (2.1 mL/kg)]  Out: 0 (0 mL/kg)   Weight: 93.5 kg     Past Cardiology Tests (Last 3 Years):  EKG:  ECG 12 Lead 04/08/2025      ECG 12 Lead 04/08/2025    Echo:  Transthoracic Echo (TTE) Complete 04/08/2025  CONCLUSIONS:   1. The left ventricular systolic function is normal, with a visually estimated ejection fraction of 65-70%.   2. There is normal right ventricular global systolic function.   3. The patient is in atrial fibrillation which may influence the estimate of left ventricular function and transvalvular flows.    Ejection Fractions:  EF   Date/Time Value Ref Range Status   04/08/2025 01:50 PM 68 %      Cath:  No results found for this or any previous visit from the past 1095 days.    Stress Test:  No results found for this or any previous visit from the past 1095 days.    Cardiac Imaging:  No results found for this or any previous visit from the past 1095 days.      Inpatient Medications:  Scheduled medications   Medication Dose Route  Frequency    aspirin  81 mg oral Daily    metoprolol tartrate  25 mg oral 4x daily     PRN medications   Medication    heparin    metoprolol    oxygen     Continuous Medications   Medication Dose Last Rate    heparin  0-4,500 Units/hr 1,500 Units/hr (04/09/25 0300)       Physical Exam  Vitals reviewed.   Constitutional:       Appearance: Normal appearance. He is normal weight.   HENT:      Head: Normocephalic and atraumatic.   Neck:      Vascular: No carotid bruit or JVD.   Cardiovascular:      Rate and Rhythm: Tachycardia present. Rhythm irregularly irregular.      Pulses: Normal pulses.      Heart sounds: Normal heart sounds.   Pulmonary:      Effort: Pulmonary effort is normal.      Breath sounds: Normal breath sounds.   Abdominal:      General: Abdomen is flat. Bowel sounds are normal.      Palpations: Abdomen is soft.   Skin:     General: Skin is warm and dry.   Neurological:      General: No focal deficit present.      Mental Status: He is alert and oriented to person, place, and time. Mental status is at baseline.   Psychiatric:         Mood and Affect: Mood normal.         Behavior: Behavior normal.       Assessment/Plan   Assessment  44 y.o. male with PMH of pneumothorax, splenic injury after trauma presenting with palpitations x 4 days. Lab work shows normal troponin x3, normal CBC, BMP. EKG shows atrial fibrillation  bpm. CT chest shows no evidence of aortic aneurysm or dissection, multiple enlarged right axillary lymph nodes and multiple enlarged mediastinal and hilar lymph nodes and numerous bilateral pulmonary nodules, findings highly concerning for malignancy/metastatic disease and further evaluation and workup is recommended. Currently remains in atrial fibrillation.      [id number] Score  Age <65: 0   Sex Male: 0   CHF History No: 0   HTN No: 0   Stroke/TIA/Thromboembolism No: 0   Vascular Dz: CAD/PAD/Aortic Plaque No: 0   DM No: 0   Total Score 0      New onset atrial fibrillation with  RVR  Enlarged lymph nodes, lung nodules     Plan  -TTE with LVEF 65-70%  -NPO for FRANCESCA/DCC today  -Continue metoprolol tartrate 25 mg po QID  -Begin Eliquis 5 mg BID, stop heparin gtt 4 hours after first dose  -Monitor telemetry  -No plan for biopsy at this time per pulmonology, hematology-oncology  -OP follow up      Peripheral IV 04/08/25 18 G Distal;Right;Upper Arm (Active)   Site Assessment Clean;Dry;Intact 04/09/25 0810   Dressing Status Clean;Dry 04/09/25 0810   Number of days: 1       Code Status:  Full Code          Hero Garcia, APRN-CNP

## 2025-04-09 NOTE — PROCEDURES
Procedure narrative:  The risks, benefits, and alternatives to the procedure and sedation were explained to the patient, and informed consent was obtained. The patient was in the fasting state. A grounding pad was placed. Self-adhesive anterior-posterior defibrillation pads were applied. A defibrillator was used for monitoring and the defibrillator waveform was set to biphasic. The patient was set up for continuous monitoring of surface 12 lead ECG, capnography, and pulse oximetry. Blood pressure was monitored with automatic cuff measurements. The procedure was performed under IV conscious sedation supplemented with intermittent deep sedation delivered by anaesthesia.    Transesophageal echo (FRANCESCA) probe was inserted. There was no left atrial appendage thrombus found. FRANCESCA probe was withdrawn successfully.    When pt had been adequately sedated, they were cardioverted with a 200J biphasic shock.  This restored sinus rhythm which was confirmed by tele and 12-lead ECG.       Summary:  Patient was successfully cardioverted from Atrial fibrillation to Sinus rhythm    Complications:  Patient tolerated the procedure well with no complications noted.      Patient Instructions:  - No Driving or making legal decisions for 24 hours  - DO NOT Stop your blood thinner unless emergency without checking in with your doctor     Follow up:   DO NOT Stop your blood thinner unless emergency without checking in with your doctor.  No driving, alcohol or making legal decisions for 24 hours.  Plan for follow up with patient's cardiologist/electrophysiologist as scheduled       [facility] - [phone]       Patient called stating that she wanted all the medications (7 of them) that were sent over to the [facility] pharmacy on 5-, to be now sent over to the [facility] pharmacy. She stated that she changed insurance and no longer uses that place. Please call her with questions. Thanks!

## 2025-04-09 NOTE — ANESTHESIA POSTPROCEDURE EVALUATION
Patient: Evens Shelby    Procedure Summary       Date: 04/09/25 Room / Location: [facility]    Anesthesia Start: 1349 Anesthesia Stop: 1409    Procedure: TRANSESOPHAGEAL ECHO (FRANCESCA) W/ POSSIBLE CARDIOVERSION Diagnosis:       Atrial fibrillation with RVR (Multi)      (AF with RVR)    Scheduled Providers: Socorro Robertson MD Responsible Provider: Johnathon Harp MD    Anesthesia Type: MAC ASA Status: 3            Anesthesia Type: MAC    Vitals Value Taken Time   /80 04/09/25 1409   Temp 36 04/09/25 1409   Pulse 90 04/09/25 1409   Resp 20 04/09/25 1409   SpO2 96 04/09/25 1409       Anesthesia Post Evaluation    Patient location during evaluation: PACU  Patient participation: complete - patient participated  Level of consciousness: awake and awake and alert  Pain score: 0  Pain management: adequate  Airway patency: patent  Cardiovascular status: acceptable, blood pressure returned to baseline and hemodynamically stable  Respiratory status: acceptable, room air and spontaneous ventilation  Hydration status: acceptable  Postoperative Nausea and Vomiting: none        There were no known notable events for this encounter.

## 2025-04-09 NOTE — H&P
History Of Present Illness:    Evens Shelby is a 44 y.o. male presenting with afib, for alin/dcv.     Last Recorded Vitals:  Vitals:    04/09/25 1200 04/09/25 1205 04/09/25 1252 04/09/25 1254   BP:  (!) 137/93 112/82    BP Location:  Left arm     Patient Position:  Lying     Pulse: 106 109 103    Resp:   20    Temp:  36.6 °C (97.9 °F)     TempSrc:  Temporal     SpO2: 93% 94% 96% 96%   Weight:       Height:           Last Labs:  CBC - 4/8/2025:  5:56 AM  6.1 16.1 203    46.8      CMP - 4/9/2025:  5:47 AM  8.3 6.2 10 --- 0.4   _ 3.6 19 82      PTT - 4/8/2025:  1:10 PM  _   _ 29     Troponin I, High Sensitivity   Date/Time Value Ref Range Status   04/08/2025 05:56 AM 4 0 - 20 ng/L Final   04/08/2025 01:29 AM 5 0 - 20 ng/L Final   04/08/2025 12:30 AM 5 0 - 20 ng/L Final     BNP   Date/Time Value Ref Range Status   04/08/2025 12:30  (H) 0 - 99 pg/mL Final      Last I/O:  I/O last 3 completed shifts:  In: 1156.8 (12.4 mL/kg) [P.O.:960; I.V.:196.8 (2.1 mL/kg)]  Out: 0 (0 mL/kg)   Weight: 93.5 kg     Past Cardiology Tests (Last 3 Years):  EKG:  ECG 12 Lead 04/08/2025      ECG 12 Lead 04/08/2025    Echo:  Transthoracic Echo (TTE) Complete 04/08/2025    Ejection Fractions:  EF   Date/Time Value Ref Range Status   04/08/2025 01:50 PM 68 %      Cath:  No results found for this or any previous visit from the past 1095 days.    Stress Test:  No results found for this or any previous visit from the past 1095 days.    Cardiac Imaging:  No results found for this or any previous visit from the past 1095 days.      Past Medical History:  He has no past medical history on file.    Past Surgical History:  He has no past surgical history on file.      Social History:  He reports that he has never smoked. He has never used smokeless tobacco. No history on file for alcohol use and drug use.    Family History:  No family history on file.     Allergies:  Cardizem [diltiazem hcl]    Inpatient Medications:  Scheduled medications    Medication Dose Route Frequency    apixaban  5 mg oral q12h    aspirin  81 mg oral Daily    metoprolol tartrate  25 mg oral 4x daily     PRN medications   Medication    heparin    metoprolol    oxygen     Continuous Medications   Medication Dose Last Rate    heparin  0-4,500 Units/hr 1,500 Units/hr (04/09/25 0300)     Outpatient Medications:  Current Outpatient Medications   Medication Instructions    rivaroxaban (XARELTO) 20 mg, oral, Daily       Physical Exam:  irr     Assessment/Plan   Proceed with alin/dcv  Peripheral IV 04/08/25 18 G Distal;Right;Upper Arm (Active)   Site Assessment Clean;Dry;Intact 04/09/25 0810   Dressing Status Clean;Dry 04/09/25 0810   Number of days: 1       Code Status:  Full Code    I spent  minutes in the professional and overall care of this patient.        Socorro Robertson MD

## 2025-04-09 NOTE — PROGRESS NOTES
Evens Shelby is a 44 y.o. male on day 1 of admission presenting with Atrial fibrillation with RVR (Multi).      Subjective   Pt states he is currently feeling well. Denies current chest pain. Endorses racing heart and palpitations. No lightheadedness, dizziness, nausea, SOB.   States he had an episode of chest pain which last a few minutes yesterday around 5pm and again this morning when sitting in the chair. Did not let nurse know.        Objective     Last Recorded Vitals  /83   Pulse 86   Temp 36.7 °C (98.1 °F)   Resp 18   Wt 93.5 kg (206 lb 3.2 oz)   SpO2 96%   Intake/Output last 3 Shifts:    Intake/Output Summary (Last 24 hours) at 4/9/2025 0843  Last data filed at 4/8/2025 2030  Gross per 24 hour   Intake 916.79 ml   Output --   Net 916.79 ml       Admission Weight  Weight: 95.3 kg (210 lb) (04/08/25 0029)    Daily Weight  04/08/25 : 93.5 kg (206 lb 3.2 oz)    Image Results  ECG 12 Lead  Atrial fibrillation with rapid ventricular response  Poor R-wave progression ; consider anterior infarct, lead placement, or normal variant  Abnormal ECG  When compared with ECG of 08-APR-2025 00:22, (unconfirmed)  No significant change was found  Confirmed by Socorro Robertson ([id number]) on 4/8/2025 10:52:25 AM  ECG 12 Lead  Atrial fibrillation with rapid ventricular response  Abnormal ECG  When compared with ECG of 24-JAN-2010 18:29,  Atrial fibrillation has replaced Sinus rhythm  See ED provider note for full interpretation and clinical correlation  Confirmed by Mary Cordova ([id number]) on 4/8/2025 10:27:59 AM  CT angio chest abdomen pelvis  Narrative: Interpreted By:  Brennan Millard,   STUDY:  CT ANGIO CHEST ABDOMEN PELVIS;  4/8/2025 1:37 am      INDICATION:  Signs/Symptoms:concern for dissection.      COMPARISON:  None.      ACCESSION NUMBER(S):  [id number]      ORDERING CLINICIAN:  SHIRA SERNA      TECHNIQUE:  Contiguous axial images of the chest, abdomen and pelvis were  obtained with and without intravenous contrast.  Coronal and sagittal  reformatted images were obtained from the axial images. MIPS were  also performed and reviewed.      FINDINGS:  CT CHEST:      There are multiple enlarged right axillary lymph nodes which measure  up to 2.3 cm x 1.6 cm. There also multiple enlarged mediastinal and  hilar lymph nodes. 1.70.2 cm right superior mediastinal lymph node  and right paratracheal lymph nodes which measure up to 1.5 cm. 1.9 cm  x 1.5 cm subcarinal lymph node. 1.7 cm and 1.2 cm right hilar lymph  nodes. Left hilar lymph nodes measure up to 1.4 cm.      The heart is normal in size. No significant pericardial effusion. The  no evidence of aortic aneurysm or dissection. The ascending aorta  measures 3.3 cm in diameter.      There are multiple bilateral pulmonary nodules which measure up to 8  mm in the right lung and 8 mm in the left lung. No significant  pleural effusion. No pneumothorax.      Chronic fracture deformity of right 4th and 5th ribs.      No acute fracture of the thoracic spine.      CT ABDOMEN PELVIS:      No evidence of liver mass. The gallbladder is present. No dilatation  common bile duct.      The pancreas, spleen, and adrenal glands appear unremarkable.      Symmetric enhancement of the kidneys.  No hydronephrosis.      No evidence of abdominal aortic aneurysm or dissection. The celiac  artery, superior mesenteric artery, bilateral renal arteries, and the  inferior mesenteric artery are patent. Metallic density in the left  upper abdomen may relate to prior procedure.      Nodes bowel obstruction or acute appendicitis.      Urinary bladder is underdistended and not well evaluated.      Prostatomegaly.      No acute fracture of the lumbar spine.      Impression: No evidence of aortic aneurysm or dissection.      Multiple enlarged right axillary lymph nodes which measure 2.3 cm x  1.6 cm and multiple enlarged mediastinal and hilar lymph nodes and  numerous bilateral pulmonary nodules measure up to 8 mm,  findings  highly concerning for malignancy/metastatic disease and further  evaluation and workup is recommended.      Prostatomegaly; please correlate with PSA.      MACRO:  Brennan Millard discussed the significance and urgency of this critical  finding by telephone with  SHIRA SERNA on 4/8/2025 at 2:32 am.  (**-RCF-**) Findings:  See findings.      Signed by: Brennan Millard 4/8/2025 2:39 AM  Dictation workstation:   [id number]  XR chest 1 view  Narrative: Interpreted By:  Washington Agosto,   STUDY:  XR CHEST 1 VIEW;  4/8/2025 1:01 am      INDICATION:  Signs/Symptoms:Chest pain.          COMPARISON:  01/2011      ACCESSION NUMBER(S):  [id number]      ORDERING CLINICIAN:  SHIRA SERNA      FINDINGS:                  CARDIOMEDIASTINAL SILHOUETTE:  Cardiomediastinal silhouette is normal in size and configuration.      LUNGS:  Lungs are clear.      ABDOMEN:  No remarkable upper abdominal findings.      BONES:  No acute osseous changes.      Impression: 1.  No evidence of acute cardiopulmonary process.              MACRO:  None      Signed by: Washington Agosto 4/8/2025 1:35 AM  Dictation workstation:   [id number]      Physical Exam  Constitutional:       General: He is not in acute distress.     Appearance: Normal appearance. He is not ill-appearing or toxic-appearing.   HENT:      Head: Normocephalic and atraumatic.      Nose: Nose normal. No rhinorrhea.   Cardiovascular:      Rate and Rhythm: Normal rate. Rhythm irregular.      Heart sounds: No murmur heard.  Pulmonary:      Effort: Pulmonary effort is normal. No respiratory distress.      Breath sounds: Normal breath sounds. No wheezing, rhonchi or rales.   Abdominal:      General: Abdomen is flat. There is no distension.      Palpations: Abdomen is soft.      Tenderness: There is no abdominal tenderness. There is no guarding or rebound.   Musculoskeletal:         General: No swelling.      Right lower leg: No edema.      Left lower leg: No edema.   Skin:     Capillary  Refill: Capillary refill takes less than 2 seconds.      Findings: No erythema or rash.   Neurological:      General: No focal deficit present.      Mental Status: He is alert and oriented to person, place, and time.      Motor: No weakness.   Psychiatric:         Mood and Affect: Mood normal.         Behavior: Behavior normal.         Relevant Results    Scheduled medications  aspirin, 81 mg, oral, Daily  metoprolol tartrate, 25 mg, oral, 4x daily      Continuous medications  heparin, 0-4,500 Units/hr, Last Rate: 1,500 Units/hr (04/09/25 0300)      PRN medications  PRN medications: heparin, metoprolol, oxygen    Results for orders placed or performed during the hospital encounter of 04/08/25 (from the past 24 hours)   ECG 12 Lead   Result Value Ref Range    Ventricular Rate 128 BPM    QRS Duration 84 ms    QT Interval 304 ms    QTC Calculation(Bazett) 443 ms    R Axis 44 degrees    T Axis 21 degrees    QRS Count 21 beats    Q Onset 221 ms    T Offset 373 ms    QTC Fredericia 391 ms   aPTT - baseline   Result Value Ref Range    aPTT 29 26 - 36 seconds   Transthoracic Echo (TTE) Complete   Result Value Ref Range    BSA 2.13 m2   Heparin Assay, UFH   Result Value Ref Range    Heparin Unfractionated 0.8 See Comment Below for Therapeutic Ranges IU/mL   Heparin Assay   Result Value Ref Range    Heparin Unfractionated 0.4 See Comment Below for Therapeutic Ranges IU/mL   Heparin Assay, UFH   Result Value Ref Range    Heparin Unfractionated 0.3 See Comment Below for Therapeutic Ranges IU/mL   Heparin Assay, UFH   Result Value Ref Range    Heparin Unfractionated 0.3 See Comment Below for Therapeutic Ranges IU/mL   Comprehensive metabolic panel   Result Value Ref Range    Glucose 123 (H) 74 - 99 mg/dL    Sodium 139 136 - 145 mmol/L    Potassium 3.5 3.5 - 5.3 mmol/L    Chloride 109 (H) 98 - 107 mmol/L    Bicarbonate 23 21 - 32 mmol/L    Anion Gap 11 10 - 20 mmol/L    Urea Nitrogen 17 6 - 23 mg/dL    Creatinine 0.72 0.50 - 1.30  mg/dL    eGFR >90 >60 mL/min/1.73m*2    Calcium 8.3 (L) 8.6 - 10.3 mg/dL    Albumin 3.6 3.4 - 5.0 g/dL    Alkaline Phosphatase 82 33 - 120 U/L    Total Protein 6.2 (L) 6.4 - 8.2 g/dL    AST 10 9 - 39 U/L    Bilirubin, Total 0.4 0.0 - 1.2 mg/dL    ALT 19 10 - 52 U/L       ECG 12 Lead    Result Date: 4/8/2025  Atrial fibrillation with rapid ventricular response Poor R-wave progression ; consider anterior infarct, lead placement, or normal variant Abnormal ECG When compared with ECG of 08-APR-2025 00:22, (unconfirmed) No significant change was found Confirmed by Socorro Robertson ([id number]) on 4/8/2025 10:52:25 AM    ECG 12 Lead    Result Date: 4/8/2025  Atrial fibrillation with rapid ventricular response Abnormal ECG When compared with ECG of 24-JAN-2010 18:29, Atrial fibrillation has replaced Sinus rhythm See ED provider note for full interpretation and clinical correlation Confirmed by Mary Cordova ([id number]) on 4/8/2025 10:27:59 AM    CT angio chest abdomen pelvis    Result Date: 4/8/2025  Interpreted By:  Brennan Millard, STUDY: CT ANGIO CHEST ABDOMEN PELVIS;  4/8/2025 1:37 am   INDICATION: Signs/Symptoms:concern for dissection.   COMPARISON: None.   ACCESSION NUMBER(S): [id number]   ORDERING CLINICIAN: SHIRA SERNA   TECHNIQUE: Contiguous axial images of the chest, abdomen and pelvis were obtained with and without intravenous contrast. Coronal and sagittal reformatted images were obtained from the axial images. MIPS were also performed and reviewed.   FINDINGS: CT CHEST:   There are multiple enlarged right axillary lymph nodes which measure up to 2.3 cm x 1.6 cm. There also multiple enlarged mediastinal and hilar lymph nodes. 1.70.2 cm right superior mediastinal lymph node and right paratracheal lymph nodes which measure up to 1.5 cm. 1.9 cm x 1.5 cm subcarinal lymph node. 1.7 cm and 1.2 cm right hilar lymph nodes. Left hilar lymph nodes measure up to 1.4 cm.   The heart is normal in size. No significant pericardial effusion.  The no evidence of aortic aneurysm or dissection. The ascending aorta measures 3.3 cm in diameter.   There are multiple bilateral pulmonary nodules which measure up to 8 mm in the right lung and 8 mm in the left lung. No significant pleural effusion. No pneumothorax.   Chronic fracture deformity of right 4th and 5th ribs.   No acute fracture of the thoracic spine.   CT ABDOMEN PELVIS:   No evidence of liver mass. The gallbladder is present. No dilatation common bile duct.   The pancreas, spleen, and adrenal glands appear unremarkable.   Symmetric enhancement of the kidneys. No hydronephrosis.   No evidence of abdominal aortic aneurysm or dissection. The celiac artery, superior mesenteric artery, bilateral renal arteries, and the inferior mesenteric artery are patent. Metallic density in the left upper abdomen may relate to prior procedure.   Nodes bowel obstruction or acute appendicitis.   Urinary bladder is underdistended and not well evaluated.   Prostatomegaly.   No acute fracture of the lumbar spine.       No evidence of aortic aneurysm or dissection.   Multiple enlarged right axillary lymph nodes which measure 2.3 cm x 1.6 cm and multiple enlarged mediastinal and hilar lymph nodes and numerous bilateral pulmonary nodules measure up to 8 mm, findings highly concerning for malignancy/metastatic disease and further evaluation and workup is recommended.   Prostatomegaly; please correlate with PSA.   MACRO: Brennan Millard discussed the significance and urgency of this critical finding by telephone with  SHIRA SERNA on 4/8/2025 at 2:32 am. (**-RCF-**) Findings:  See findings.   Signed by: Brennan Millard 4/8/2025 2:39 AM Dictation workstation:   [id number]    XR chest 1 view    Result Date: 4/8/2025  Interpreted By:  Washington Agosto, STUDY: XR CHEST 1 VIEW;  4/8/2025 1:01 am   INDICATION: Signs/Symptoms:Chest pain.     COMPARISON: 01/2011   ACCESSION NUMBER(S): [id number]   ORDERING CLINICIAN: SHIRA SERNA   FINDINGS:          CARDIOMEDIASTINAL SILHOUETTE: Cardiomediastinal silhouette is normal in size and configuration.   LUNGS: Lungs are clear.   ABDOMEN: No remarkable upper abdominal findings.   BONES: No acute osseous changes.       1.  No evidence of acute cardiopulmonary process.       MACRO: None   Signed by: Washington Agosto 4/8/2025 1:35 AM Dictation workstation:   [id number]         Assessment/Plan    Evens Shelby is a 44 yr old male with PMH of pneumothorax, splenic injury, right eye open globe injury, right ocular HTN, and right aphakia who presented to the ED with chief complaint of palpitations x4 days, SOB with exertion, lightheadedness, and increasing fatigue. Admitted on 4/8 with A-fib RVR.      #New onset A-fib with RVR  -HR between 74-98 bpm overnight.  -Continue to monitor vitals, including HR  -Advised pt again today that if he develops palpitations, racing heart, lightheadedness, SOB, chest pain, please immediately let nurse know.   -CHADSVASC: 0  -IV cardizem was attempted in ED but pt reacted adversely  -Cardiology consulted  -Metoprolol tartrate 25mg QID PO for rate control   -Continue heparin drip. Pending cost effective alternate medication.   -TTE results pending   -FRANCESCA and cardioversion today   -Pt has been NPO from midnight  -Tele monitoring      #Bilateral pulmonary nodules with multiple axillary, mediastinal and hilar adenopathy  -Suspicious for malignancy with metastatic disease per radiology read  -No prior CT chest for comparison   -Pulmonary signed off - recommend 1 month follow up with Dr. Muhammad, and 3 month repeat imaging  -Heme/onc consulted - recommend repeat imaging in 6 months and outpatient follow up      #Prostatomegaly  -Pt denies having any BPH or urinary symptoms  -PSA WNL     Code status: full code    Dispo: FRANCESCA and cardioversion today, pending TTE read     Case discussed with Dr. Cassidy.      Gelacio Aquino,   PGY-1, Family Medicine  [facility]

## 2025-04-09 NOTE — ANESTHESIA PREPROCEDURE EVALUATION
Patient: Evens Shelby    Procedure Information       Anesthesia Start Date/Time: 04/09/25 1349    Scheduled providers: Socorro Robertson MD    Procedure: TRANSESOPHAGEAL ECHO (FRANCESCA) W/ POSSIBLE CARDIOVERSION    Location: [facility]          Vitals:    04/09/25 1254   BP:    Pulse:    Resp:    Temp:    SpO2: 96%       History reviewed. No pertinent surgical history.  History reviewed. No pertinent past medical history.    Current Facility-Administered Medications:     aspirin EC tablet 81 mg, 81 mg, oral, Daily, Adri Morales MD, 81 mg at 04/09/25 0903    butamben-tetracaine-benzocaine (Cetacaine) spray, , , PRN, Socorro Robertson MD, 1 spray at 04/09/25 1353    heparin 25,000 Units in dextrose 5% 250 mL (100 Units/mL) infusion (premix), 0-4,500 Units/hr, intravenous, Continuous, Sharon LAURA Khanh, DO, Last Rate: 15 mL/hr at 04/09/25 0300, 1,500 Units/hr at 04/09/25 0300    heparin bolus from bag 3,000-6,000 Units, 3,000-6,000 Units, intravenous, q4h PRN, Sharon P Khanh, DO    lidocaine (Xylocaine) 2 % mouth solution, , , PRN, Socorro Robertson MD, 1.25 mL at 04/09/25 1353    metoprolol tartrate (Lopressor) injection 5 mg, 5 mg, intravenous, q6h PRN, CHEN Rico-CNP, 5 mg at 04/08/25 1415    metoprolol tartrate (Lopressor) tablet 25 mg, 25 mg, oral, 4x daily, CHEN Rico-CNP, 25 mg at 04/08/25 1702    oxygen (O2) therapy, , inhalation, Continuous PRN - O2/gases, Sharon LAURA Khanh, DO    rivaroxaban (Xarelto) tablet 20 mg, 20 mg, oral, Daily with evening meal, Sharon LAURA Khanh, DO    Facility-Administered Medications Ordered in Other Encounters:     midazolam (Versed) injection, , intravenous, PRN, KITTY Delatorre, 2 mg at 04/09/25 1353    propofol (Diprivan) injection, , intravenous, PRN, KITTY Delatorre, 20 mg at 04/09/25 1357    sodium chloride 0.9% infusion, , intravenous, Continuous PRN, KITTY Delatorre, Last Rate: 100 mL/hr at  "04/09/25 1350, New Bag at 04/09/25 1350  Prior to Admission medications    Medication Sig Start Date End Date Taking? Authorizing Provider   rivaroxaban (Xarelto) 20 mg tablet Take 1 tablet (20 mg) by mouth once daily. 4/9/25 5/9/25  Sharon Cassidy,    apixaban (Eliquis) 5 mg tablet Take 1 tablet (5 mg) by mouth every 12 hours. 4/9/25 4/9/25  Sharon Cassidy DO     Allergies   Allergen Reactions    Cardizem [Diltiazem Hcl] Hives     Social History     Tobacco Use    Smoking status: Never    Smokeless tobacco: Never   Substance Use Topics    Alcohol use: Not on file         Chemistry    Lab Results   Component Value Date/Time     04/09/2025 0547    K 3.5 04/09/2025 0547     (H) 04/09/2025 0547    CO2 23 04/09/2025 0547    BUN 17 04/09/2025 0547    CREATININE 0.72 04/09/2025 0547    Lab Results   Component Value Date/Time    CALCIUM 8.3 (L) 04/09/2025 0547    ALKPHOS 82 04/09/2025 0547    AST 10 04/09/2025 0547    ALT 19 04/09/2025 0547    BILITOT 0.4 04/09/2025 0547          Lab Results   Component Value Date/Time    WBC 6.1 04/08/2025 0556    HGB 16.1 04/08/2025 0556    HCT 46.8 04/08/2025 0556     04/08/2025 0556     No results found for: \"PROTIME\", \"PTT\", \"INR\"  Encounter Date: 04/08/25   ECG 12 Lead   Result Value    Ventricular Rate 128    QRS Duration 84    QT Interval 304    QTC Calculation(Bazett) 443    R Axis 44    T Axis 21    QRS Count 21    Q Onset 221    T Offset 373    QTC Fredericia 391    Narrative    Atrial fibrillation with rapid ventricular response  Poor R-wave progression ; consider anterior infarct, lead placement, or normal variant  Abnormal ECG  When compared with ECG of 08-APR-2025 00:22, (unconfirmed)  No significant change was found  Confirmed by Socorro Robertson ([id number]) on 4/8/2025 10:52:25 AM     No results found for this or any previous visit from the past 1095 days.      Relevant Problems   Cardiac   (+) Atrial fibrillation with RVR (Multi)      Pulmonary   (+) " Lung nodules       Clinical information reviewed:   Tobacco  Allergies  Meds  Problems  Med Hx  Surg Hx   Fam Hx  Soc   Hx        NPO Detail:  NPO/Void Status  Date of Last Liquid: 04/08/25  Time of Last Liquid: 2200         Physical Exam    Airway  Mallampati: II     Cardiovascular - normal exam     Dental    Pulmonary    Abdominal            Anesthesia Plan    History of general anesthesia?: yes  History of complications of general anesthesia?: no    ASA 3     MAC     The patient is not a current smoker.  Patient was not previously instructed to abstain from smoking on day of procedure.  Patient did not smoke on day of procedure.  Education provided regarding risk of obstructive sleep apnea.  intravenous induction   Anesthetic plan and risks discussed with patient.    Plan discussed with CRNA.

## 2025-04-09 NOTE — CARE PLAN
The patient's goals for the shift include  remain comfortable    The clinical goals for the shift include pt to receive cardioversion

## 2025-04-09 NOTE — PROGRESS NOTES
04/09/25 0798   Discharge Planning   Living Arrangements Spouse/significant other   Support Systems Spouse/significant other;Family members   Assistance Needed Patient is A&OX3. Patient is independent in ADLS, ambulates without assistive devices, and wears no O2, CPAP or Bipap at baseline. No active HHC agency. PCP was Dr. Durand who recently retired, patient will stay with same office. No anticoagulation medication prior to admit.   Type of Residence Private residence   Number of Stairs to Enter Residence 6   Number of Stairs Within Residence 26   Do you have animals or pets at home? No   Who is requesting discharge planning? Provider   Home or Post Acute Services None   Expected Discharge Disposition Home  (Denies any HHC needs. New anticoagulation for afib. Eliquis is $707 and Xarelto is $730. Patient spouse called [facility] [phone]. This pharmacy can provide xarelto for $615. Will get free 30 day supply from  [facility] on d/c)   Does the patient need discharge transport arranged? No   Stroke Family Assessment   Stroke Family Assessment Needed No   Intensity of Service   Intensity of Service 0-30 min     4/9/25 @ 1402: [facility] pharmacy is mailing out paperwork from [employer]  of xarelto for patient to apply for medication for free. Educated patient and spouse to complete this paperwork when received and follow up with cardiology office to complete physician portion of the paperwork.

## 2025-04-10 ENCOUNTER — PHARMACY VISIT (OUTPATIENT)
Dept: PHARMACY | Facility: CLINIC | Age: 45
End: 2025-04-10
Payer: COMMERCIAL

## 2025-04-10 VITALS
WEIGHT: 206.2 LBS | HEIGHT: 69 IN | SYSTOLIC BLOOD PRESSURE: 121 MMHG | DIASTOLIC BLOOD PRESSURE: 73 MMHG | OXYGEN SATURATION: 94 % | HEART RATE: 83 BPM | TEMPERATURE: 97.9 F | RESPIRATION RATE: 18 BRPM | BODY MASS INDEX: 30.54 KG/M2

## 2025-04-10 PROBLEM — I48.91 ATRIAL FIBRILLATION WITH RVR (MULTI): Status: RESOLVED | Noted: 2025-04-08 | Resolved: 2025-04-10

## 2025-04-10 LAB
ALBUMIN SERPL BCP-MCNC: 3.6 G/DL (ref 3.4–5)
ALP SERPL-CCNC: 82 U/L (ref 33–120)
ALT SERPL W P-5'-P-CCNC: 18 U/L (ref 10–52)
ANION GAP SERPL CALC-SCNC: 10 MMOL/L (ref 10–20)
AST SERPL W P-5'-P-CCNC: 9 U/L (ref 9–39)
ATRIAL RATE: 92 BPM
BILIRUB SERPL-MCNC: 0.4 MG/DL (ref 0–1.2)
BUN SERPL-MCNC: 18 MG/DL (ref 6–23)
CALCIUM SERPL-MCNC: 8.2 MG/DL (ref 8.6–10.3)
CHLORIDE SERPL-SCNC: 107 MMOL/L (ref 98–107)
CO2 SERPL-SCNC: 25 MMOL/L (ref 21–32)
CREAT SERPL-MCNC: 0.78 MG/DL (ref 0.5–1.3)
EGFRCR SERPLBLD CKD-EPI 2021: >90 ML/MIN/1.73M*2
ERYTHROCYTE [DISTWIDTH] IN BLOOD BY AUTOMATED COUNT: 12.4 % (ref 11.5–14.5)
GLUCOSE SERPL-MCNC: 97 MG/DL (ref 74–99)
HCT VFR BLD AUTO: 46.8 % (ref 41–52)
HGB BLD-MCNC: 15.9 G/DL (ref 13.5–17.5)
MCH RBC QN AUTO: 29 PG (ref 26–34)
MCHC RBC AUTO-ENTMCNC: 34 G/DL (ref 32–36)
MCV RBC AUTO: 85 FL (ref 80–100)
NRBC BLD-RTO: 0 /100 WBCS (ref 0–0)
P AXIS: 61 DEGREES
P OFFSET: 200 MS
P ONSET: 146 MS
PLATELET # BLD AUTO: 217 X10*3/UL (ref 150–450)
POTASSIUM SERPL-SCNC: 3.9 MMOL/L (ref 3.5–5.3)
PR INTERVAL: 148 MS
PROT SERPL-MCNC: 6.2 G/DL (ref 6.4–8.2)
Q ONSET: 220 MS
QRS COUNT: 15 BEATS
QRS DURATION: 86 MS
QT INTERVAL: 342 MS
QTC CALCULATION(BAZETT): 422 MS
QTC FREDERICIA: 394 MS
R AXIS: 24 DEGREES
RBC # BLD AUTO: 5.49 X10*6/UL (ref 4.5–5.9)
SODIUM SERPL-SCNC: 138 MMOL/L (ref 136–145)
T AXIS: 27 DEGREES
T OFFSET: 391 MS
VENTRICULAR RATE: 92 BPM
WBC # BLD AUTO: 5.8 X10*3/UL (ref 4.4–11.3)

## 2025-04-10 PROCEDURE — 80053 COMPREHEN METABOLIC PANEL: CPT

## 2025-04-10 PROCEDURE — 2500000005 HC RX 250 GENERAL PHARMACY W/O HCPCS: Performed by: FAMILY MEDICINE

## 2025-04-10 PROCEDURE — 99232 SBSQ HOSP IP/OBS MODERATE 35: CPT | Performed by: NURSE PRACTITIONER

## 2025-04-10 PROCEDURE — RXMED WILLOW AMBULATORY MEDICATION CHARGE

## 2025-04-10 PROCEDURE — 2500000002 HC RX 250 W HCPCS SELF ADMINISTERED DRUGS (ALT 637 FOR MEDICARE OP, ALT 636 FOR OP/ED): Performed by: FAMILY MEDICINE

## 2025-04-10 PROCEDURE — 36415 COLL VENOUS BLD VENIPUNCTURE: CPT | Performed by: NURSE PRACTITIONER

## 2025-04-10 PROCEDURE — 99239 HOSP IP/OBS DSCHRG MGMT >30: CPT

## 2025-04-10 PROCEDURE — 94760 N-INVAS EAR/PLS OXIMETRY 1: CPT

## 2025-04-10 PROCEDURE — 2500000001 HC RX 250 WO HCPCS SELF ADMINISTERED DRUGS (ALT 637 FOR MEDICARE OP): Performed by: INTERNAL MEDICINE

## 2025-04-10 PROCEDURE — 85027 COMPLETE CBC AUTOMATED: CPT | Performed by: NURSE PRACTITIONER

## 2025-04-10 PROCEDURE — 2500000001 HC RX 250 WO HCPCS SELF ADMINISTERED DRUGS (ALT 637 FOR MEDICARE OP): Performed by: NURSE PRACTITIONER

## 2025-04-10 RX ORDER — METOPROLOL SUCCINATE 50 MG/1
50 TABLET, EXTENDED RELEASE ORAL DAILY
Qty: 30 TABLET | Refills: 0 | Status: SHIPPED | OUTPATIENT
Start: 2025-04-11 | End: 2025-05-11

## 2025-04-10 RX ORDER — METOPROLOL SUCCINATE 50 MG/1
50 TABLET, EXTENDED RELEASE ORAL DAILY
Status: DISCONTINUED | OUTPATIENT
Start: 2025-04-10 | End: 2025-04-10 | Stop reason: HOSPADM

## 2025-04-10 RX ADMIN — ASPIRIN 81 MG: 81 TABLET, COATED ORAL at 09:47

## 2025-04-10 RX ADMIN — RIVAROXABAN 20 MG: 20 TABLET, FILM COATED ORAL at 17:04

## 2025-04-10 RX ADMIN — METOPROLOL TARTRATE 25 MG: 25 TABLET, FILM COATED ORAL at 05:17

## 2025-04-10 RX ADMIN — METOPROLOL SUCCINATE 50 MG: 50 TABLET, EXTENDED RELEASE ORAL at 11:32

## 2025-04-10 RX ADMIN — Medication 21 PERCENT: at 11:48

## 2025-04-10 ASSESSMENT — COGNITIVE AND FUNCTIONAL STATUS - GENERAL
MOBILITY SCORE: 24
DAILY ACTIVITIY SCORE: 24

## 2025-04-10 ASSESSMENT — PAIN SCALES - GENERAL: PAINLEVEL_OUTOF10: 0 - NO PAIN

## 2025-04-10 ASSESSMENT — PAIN - FUNCTIONAL ASSESSMENT: PAIN_FUNCTIONAL_ASSESSMENT: 0-10

## 2025-04-10 NOTE — DISCHARGE SUMMARY
Discharge Diagnosis  Atrial fibrillation with RVR (Multi)    Issues Requiring Follow-Up  New onset A-fib resolved with cardioversion 4/9/2025  -Discharged home with metoprolol 50mg daily and xarelto 20mg daily   -Outpatient follow up with cardiology in 1 week   Multiple pulmonary nodules, with multiple axillary/mediastinal/hilar adenopathy  -1 month oupatient follow up with Dr. Muhammad pulm   -1 month oupatient follow up with Dr. Ramos heme-onc  -Repeat imaging in 3 months   Prostatomegaly   -Outpatient follow up with PCP, referral provided    Discharge Meds     Medication List      START taking these medications     metoprolol succinate XL 50 mg 24 hr tablet; Commonly known as:   Toprol-XL; Take 1 tablet (50 mg) by mouth once daily. Do not crush or   chew.; Start taking on: April 11, 2025   rivaroxaban 20 mg tablet; Commonly known as: Xarelto; Take 1 tablet (20   mg) by mouth once daily.       Test Results Pending At Discharge  Pending Labs       No current pending labs.            Hospital Course   Evens Shelby is a 44 yr old male with PMH of pneumothorax, splenic injury, right eye open globe injury, right ocular HTN, and right aphakia who presented to the ED with chief complaint of palpitations x4 days, SOB with exertion, lightheadedness, and increasing fatigue. EKG completed in the ED demonstrated A-fib RVR and BNP elevation. Pt started on metoprolol in the ED, but remained in A-fib, so was started on cardizem drip in the ED. Pt reacted adversely to cardizem with hives. Given more metoprolol, but continued to remain in A-fib. Admitted with tele monitoring on 4/8 with A-fib RVR. Troponins, calcium, Mg, and TSH WNL. Cardiology was consulted and pt was placed on heparin drip and metoprolol tartrate 25mg QID. TTE ordered. In addition, CT chest completed in the ED showed multiple pulmonary nodules measuring up to 8mm, along with axillary, mediastinal. and hilar lymph nodes. Also demonstrated prostatomegaly. PSA normal.  Pulm and heme-onc consulted for the lung nodules. TTE demonstrated LVEF 65-70%. Heparin drip stopped and pt started on Eliquis 5mg BID. Cardioversion completed on 4/9 and pt converted to NSR. After cardioversion, pt's vitals remained stable. Pt complained of diarrhea after the cardioversion, but on discharge exam stated that his stool was more formed now. Stated that he usually has GI upset with procedures. Pulm and heme-onc recommended outpatient follow up and repeat imaging for the lung nodules. As such, pt discharged home in stable condition. Prescriptions for metoprolol and xarelto provided. Outpatient follow up with cardiology, pulm, heme-onc, and PCP.     Pertinent Physical Exam At Time of Discharge  Physical Exam  Constitutional:       General: He is not in acute distress.     Appearance: Normal appearance. He is normal weight. He is not ill-appearing or toxic-appearing.   Cardiovascular:      Rate and Rhythm: Normal rate and regular rhythm.      Pulses: Normal pulses.      Heart sounds: Normal heart sounds. No murmur heard.  Pulmonary:      Effort: Pulmonary effort is normal. No respiratory distress.      Breath sounds: Normal breath sounds. No wheezing, rhonchi or rales.   Abdominal:      General: Bowel sounds are normal.      Palpations: Abdomen is soft.      Tenderness: There is no abdominal tenderness. There is no guarding or rebound.   Musculoskeletal:         General: No swelling.      Right lower leg: No edema.      Left lower leg: No edema.   Skin:     Capillary Refill: Capillary refill takes less than 2 seconds.      Findings: No erythema or rash.   Neurological:      General: No focal deficit present.      Mental Status: He is alert and oriented to person, place, and time.   Psychiatric:         Mood and Affect: Mood normal.         Behavior: Behavior normal.         Outpatient Follow-Up  Future Appointments   Date Time Provider Department Center   5/1/2025 10:00 AM Hero Garcia, CHEN-CNP  [id number]       Gelacio Aquino DO  PGY-1, Family Medicine  [facility]

## 2025-04-10 NOTE — PROGRESS NOTES
Subjective Data:  Continues with CP, SOB, and palpitations    Overnight Events:    Remains in atrial fibrillation 90s-100s     Objective Data:  Last Recorded Vitals:  Vitals:    04/10/25 0400 04/10/25 0423 04/10/25 0756 04/10/25 0800   BP:  107/67 123/80    BP Location:  Left arm Left arm    Patient Position:  Lying Lying    Pulse: 65 74 67 67   Resp:  18 18    Temp:  36.7 °C (98.1 °F) 36.7 °C (98.1 °F)    TempSrc:  Temporal Temporal    SpO2: 95% 94% 94% 94%   Weight:       Height:           Last Labs:  CBC - 4/10/2025:  5:44 AM  5.8 15.9 217    46.8      CMP - 4/10/2025:  5:44 AM  8.2 6.2 9 --- 0.4   _ 3.6 18 82      PTT - 4/8/2025:  1:10 PM  _   _ 29     TROPHS   Date/Time Value Ref Range Status   04/08/2025 05:56 AM 4 0 - 20 ng/L Final   04/08/2025 01:29 AM 5 0 - 20 ng/L Final   04/08/2025 12:30 AM 5 0 - 20 ng/L Final     BNP   Date/Time Value Ref Range Status   04/08/2025 12:30  0 - 99 pg/mL Final      Last I/O:  I/O last 3 completed shifts:  In: 956.5 (10.2 mL/kg) [P.O.:480; I.V.:476.5 (5.1 mL/kg)]  Out: 0 (0 mL/kg)   Weight: 93.5 kg     Past Cardiology Tests (Last 3 Years):  EKG:  ECG 12 Lead 04/08/2025      ECG 12 Lead 04/08/2025    Echo:  Transthoracic Echo (TTE) Complete 04/08/2025  CONCLUSIONS:   1. The left ventricular systolic function is normal, with a visually estimated ejection fraction of 65-70%.   2. There is normal right ventricular global systolic function.   3. The patient is in atrial fibrillation which may influence the estimate of left ventricular function and transvalvular flows.    Ejection Fractions:  EF   Date/Time Value Ref Range Status   04/09/2025 02:09 PM 63 %    04/08/2025 01:50 PM 68 %      Cath:  No results found for this or any previous visit from the past 1095 days.    Stress Test:  No results found for this or any previous visit from the past 1095 days.    Cardiac Imaging:  No results found for this or any previous visit from the past 1095 days.      Inpatient  Medications:  Scheduled medications   Medication Dose Route Frequency    aspirin  81 mg oral Daily    metoprolol tartrate  25 mg oral 4x daily    rivaroxaban  20 mg oral Daily with evening meal     PRN medications   Medication    metoprolol    oxygen     Continuous Medications   Medication Dose Last Rate       Physical Exam  Vitals reviewed.   Constitutional:       Appearance: Normal appearance. He is normal weight.   HENT:      Head: Normocephalic and atraumatic.   Neck:      Vascular: No carotid bruit or JVD.   Cardiovascular:      Rate and Rhythm: Tachycardia present. Rhythm irregularly irregular.      Pulses: Normal pulses.      Heart sounds: Normal heart sounds.   Pulmonary:      Effort: Pulmonary effort is normal.      Breath sounds: Normal breath sounds.   Abdominal:      General: Abdomen is flat. Bowel sounds are normal.      Palpations: Abdomen is soft.   Skin:     General: Skin is warm and dry.   Neurological:      General: No focal deficit present.      Mental Status: He is alert and oriented to person, place, and time. Mental status is at baseline.   Psychiatric:         Mood and Affect: Mood normal.         Behavior: Behavior normal.       Assessment/Plan   Assessment  44 y.o. male with PMH of pneumothorax, splenic injury after trauma presenting with palpitations x 4 days. Lab work shows normal troponin x3, normal CBC, BMP. EKG shows atrial fibrillation  bpm. CT chest shows no evidence of aortic aneurysm or dissection, multiple enlarged right axillary lymph nodes and multiple enlarged mediastinal and hilar lymph nodes and numerous bilateral pulmonary nodules, findings highly concerning for malignancy/metastatic disease and further evaluation and workup is recommended. Currently remains in atrial fibrillation.        [id number] Score  Age <65: 0   Sex Male: 0   CHF History No: 0   HTN No: 0   Stroke/TIA/Thromboembolism No: 0   Vascular Dz: CAD/PAD/Aortic Plaque No: 0   DM No: 0   Total Score 0       New onset atrial fibrillation with RVR  Enlarged lymph nodes, lung nodules       Plan  -TTE with LVEF 65-70%  -S/p FRANCESCA/DCC 4/9    -Stop metoprolol tartrate 25 mg po QID, switch to Toprol 50 mg daily  -Continue Xarelto 20 mg daily  -No plan for biopsy at this time per pulmonology, hematology-oncology  -OP follow up    Peripheral IV 04/08/25 18 G Distal;Right;Upper Arm (Active)   Site Assessment Clean;Dry;Intact 04/09/25 0810   Dressing Status Clean;Dry 04/09/25 0810   Number of days: 1     Code Status:  Full Code      Hero Garcia, APRN-CNP

## 2025-04-10 NOTE — CARE PLAN
The patient's goals for the shift include      The clinical goals for the shift include patient remain safe      Problem: Pain - Adult  Goal: Verbalizes/displays adequate comfort level or baseline comfort level  Outcome: Progressing     Problem: Safety - Adult  Goal: Free from fall injury  Outcome: Progressing     Problem: Discharge Planning  Goal: Discharge to home or other facility with appropriate resources  Outcome: Progressing     Problem: Chronic Conditions and Co-morbidities  Goal: Patient's chronic conditions and co-morbidity symptoms are monitored and maintained or improved  Outcome: Progressing

## 2025-04-10 NOTE — PROGRESS NOTES
04/10/25 1113   Discharge Planning   Expected Discharge Disposition Home  (Home with spouse , denies any [facility] needs, will d/c with free 30 day supply for xarelto and will follow with with cardiology for subsequent script going to [facility] who can provide medication at discount rate.)   Does the patient need discharge transport arranged? No     4/10/25 @ 1117: Patient and spouse have contact information for [facility] pharmacy and have been in contact with them. Xarelto on discharge will come from [facility] pharmacy

## 2025-04-11 ENCOUNTER — TELEPHONE (OUTPATIENT)
Dept: PRIMARY CARE | Facility: CLINIC | Age: 45
End: 2025-04-11
Payer: COMMERCIAL

## 2025-04-11 NOTE — TELEPHONE ENCOUNTER
Just released from hosp .  Heart shocked for Afib.  Has appts with cardiology and pulmonary.  Do you want to see and manage him a pcp.  Has not been here since 1999.  Hosp said he could come here.  [phone]